# Patient Record
Sex: FEMALE | Race: WHITE | NOT HISPANIC OR LATINO | Employment: OTHER | URBAN - METROPOLITAN AREA
[De-identification: names, ages, dates, MRNs, and addresses within clinical notes are randomized per-mention and may not be internally consistent; named-entity substitution may affect disease eponyms.]

---

## 2017-06-15 ENCOUNTER — OFFICE VISIT (OUTPATIENT)
Dept: OBSTETRICS AND GYNECOLOGY | Facility: CLINIC | Age: 67
End: 2017-06-15

## 2017-06-15 VITALS
SYSTOLIC BLOOD PRESSURE: 138 MMHG | BODY MASS INDEX: 28.61 KG/M2 | DIASTOLIC BLOOD PRESSURE: 80 MMHG | HEIGHT: 64 IN | WEIGHT: 167.6 LBS

## 2017-06-15 DIAGNOSIS — Z01.419 ENCOUNTER FOR GYNECOLOGICAL EXAMINATION WITHOUT ABNORMAL FINDING: ICD-10-CM

## 2017-06-15 DIAGNOSIS — Z78.0 MENOPAUSE: Primary | ICD-10-CM

## 2017-06-15 DIAGNOSIS — M85.80 OSTEOPENIA: ICD-10-CM

## 2017-06-15 PROCEDURE — G0101 CA SCREEN;PELVIC/BREAST EXAM: HCPCS | Performed by: OBSTETRICS & GYNECOLOGY

## 2017-06-15 NOTE — PROGRESS NOTES
"   Chief Complaint   Patient presents with   • Gynecologic Exam   • Med Refill     calcium with vit d       Vashti Pichardo is a 66 y.o. year old  presenting to be seen for a wellness examination.  This patient is menopausal.  She does not use estrogen/progestin therapy.  She denies vaginal dryness or irritation.  She has a history of osteopenia of the femoral necks and does weight-bearing exercise and also takes calcium with vitamin D daily.  She is due for a repeat DEXA.  Breast imaging in 2016 was benign.  She has previously had a posterior/enterocele repair with excellent results.    SCREENING TESTS    Year 2012   Age                         PAP      *                   HPV high risk                         Mammogram     benign *                   WOOD score                         Breast MRI                         Lipids                         Vitamin D                         Colonoscopy                         DEXA  Frax (hip/any) osteopenia                        Ovarian Screen                           Enter the month test was performed.  If month not known, enter \"X'  · Black numbers = normal results  · Red numbers = abnormal results  · Black X = patient reported normal  · Red X - patient reported abnormal      Referred by:    Profession:    Other info:        She exercises regularly: yes.  She wears her seat belt: yes.  She has concerns about domestic violence: no.  She has noticed changes in height: no    GYN screening history:  · Last mammogram: was done on approximately 8/15/2016 and the result was: Birads II (Benign findings)..    No Additional Complaints Reported    The following portions of the patient's history were reviewed and updated as appropriate:vital signs and   She  does not have any pertinent problems on file.  She  has a past surgical history that includes " "Laparoscopic tubal ligation; Posterior repair; Enterocele repair; Cataract extraction, bilateral; Knee surgery (Left, 08/23/2007); and Breast biopsy (Left, 1990's).  Her family history includes Arrhythmia in her mother; Coronary artery disease in her brother; Heart attack in her father; Hyperlipidemia in her other; Osteoarthritis in her other; Osteoporosis in her mother; Rheum arthritis in her other; Stroke in her other. There is no history of Breast cancer or Ovarian cancer.  She  reports that she has never smoked. She has never used smokeless tobacco. She reports that she drinks alcohol. She reports that she does not use illicit drugs.  Current Outpatient Prescriptions   Medication Sig Dispense Refill   • aspirin 81 MG tablet Take 81 mg by mouth daily.     • calcium carbonate-vitamin d 600-400 MG-UNIT per tablet Take 1 tablet by mouth Daily. 90 tablet 3   • Cholecalciferol (VITAMIN D3) 1000 UNITS capsule Take 1 capsule by mouth Daily.     • lisinopril-hydrochlorothiazide (PRINZIDE,ZESTORETIC) 20-25 MG per tablet Take 1 tablet by mouth daily.     • vitamin D (ERGOCALCIFEROL) 29880 UNITS capsule capsule Take 50,000 Units by mouth 1 (one) time per week.       No current facility-administered medications for this visit.      She is allergic to penicillins..    Review of Systems  A comprehensive review of systems was taken.  Constitutional: negative for fever, chills, activity change, appetite change, fatigue and unexpected weight change.  Respiratory: negative  Cardiovascular: negative  Gastrointestinal: negative  Genitourinary:negative  Musculoskeletal:negative  Behavioral/Psych: negative       /80  Ht 64\" (162.6 cm)  Wt 167 lb 9.6 oz (76 kg)  LMP  (LMP Unknown)  BMI 28.77 kg/m2    Physical Exam    General:  alert; cooperative; well developed; well nourished   Skin:  No suspicious lesions seen   Thyroid: normal to inspection and palpation   Lungs:  clear to auscultation bilaterally   Heart:  regular rate " and rhythm, S1, S2 normal, no murmur, click, rub or gallop   Breasts:  Examined in supine position  Symmetric without masses or skin dimpling  Nipples normal without inversion, lesions or discharge  There are no palpable axillary nodes   Abdomen: soft, non-tender; no masses  no umbilical or inginual hernias are present  no hepato-splenomegaly   Pelvis: Clinical staff was present for exam  External genitalia:  normal appearance of the external genitalia including Bartholin's and [place]'s glands.  Vaginal:  there is the tip of an ethibond suture visible posteriorally.  This is nontender and causing no symptoms.  I was unable to snip the suture without causing pain.  Cervix:  normal appearance.  Uterus:  normal size, shape and consistency. anteverted;  Adnexa:  non palpable bilaterally.  Rectal:  anus visually normal appearing. recto-vaginal exam unremarkable and confirms findings;     Lab Review   No data reviewed    Imaging  Mammogram results         ASSESSMENT  Problems Addressed this Visit        Musculoskeletal and Integument    Osteopenia    Relevant Medications    calcium carbonate-vitamin d 600-400 MG-UNIT per tablet       Genitourinary    Menopause - Primary      Other Visit Diagnoses     Encounter for gynecological examination without abnormal finding              PLAN    Medications prescribed this encounter:    New Medications Ordered This Visit   Medications   • Cholecalciferol (VITAMIN D3) 1000 UNITS capsule     Sig: Take 1 capsule by mouth Daily.   • calcium carbonate-vitamin d 600-400 MG-UNIT per tablet     Sig: Take 1 tablet by mouth Daily.     Dispense:  90 tablet     Refill:  3   · Pap test done  · Calcium, 600 mg/ Vit. D, 400 IU daily; regular weight-bearing exercise  · Follow up: 12 month(s)  *Please note that portions of this documentation may have been completed with a voice recognition program.  Efforts were made to edit this dictation, but occasional words may have been mistranscribed.        This note was electronically signed.    ART Mantilla MD  Yamilka 15, 2017  1:30 PM

## 2017-07-10 ENCOUNTER — TRANSCRIBE ORDERS (OUTPATIENT)
Dept: ADMINISTRATIVE | Facility: HOSPITAL | Age: 67
End: 2017-07-10

## 2017-07-10 DIAGNOSIS — Z12.31 VISIT FOR SCREENING MAMMOGRAM: Primary | ICD-10-CM

## 2017-08-16 ENCOUNTER — HOSPITAL ENCOUNTER (OUTPATIENT)
Dept: MAMMOGRAPHY | Facility: HOSPITAL | Age: 67
Discharge: HOME | End: 2017-08-16
Attending: OBSTETRICS & GYNECOLOGY | Admitting: OBSTETRICS & GYNECOLOGY

## 2017-08-16 DIAGNOSIS — Z12.31 VISIT FOR SCREENING MAMMOGRAM: ICD-10-CM

## 2017-08-16 PROCEDURE — G0202 SCR MAMMO BI INCL CAD: HCPCS

## 2017-08-16 PROCEDURE — 77063 BREAST TOMOSYNTHESIS BI: CPT | Performed by: RADIOLOGY

## 2017-08-16 PROCEDURE — G0202 SCR MAMMO BI INCL CAD: HCPCS | Performed by: RADIOLOGY

## 2017-08-16 PROCEDURE — 77063 BREAST TOMOSYNTHESIS BI: CPT

## 2018-06-18 ENCOUNTER — OFFICE VISIT (OUTPATIENT)
Dept: OBSTETRICS AND GYNECOLOGY | Facility: CLINIC | Age: 68
End: 2018-06-18

## 2018-06-18 VITALS
DIASTOLIC BLOOD PRESSURE: 64 MMHG | BODY MASS INDEX: 28.82 KG/M2 | SYSTOLIC BLOOD PRESSURE: 130 MMHG | HEIGHT: 64 IN | WEIGHT: 168.8 LBS

## 2018-06-18 DIAGNOSIS — M85.88 OSTEOPENIA OF OTHER SITE: ICD-10-CM

## 2018-06-18 DIAGNOSIS — Z01.419 ENCOUNTER FOR GYNECOLOGICAL EXAMINATION WITHOUT ABNORMAL FINDING: ICD-10-CM

## 2018-06-18 DIAGNOSIS — Z78.0 MENOPAUSE: Primary | ICD-10-CM

## 2018-06-18 PROCEDURE — G0101 CA SCREEN;PELVIC/BREAST EXAM: HCPCS | Performed by: OBSTETRICS & GYNECOLOGY

## 2018-06-18 RX ORDER — RISEDRONATE SODIUM 35 MG/1
1 TABLET, DELAYED RELEASE ORAL WEEKLY
COMMUNITY
Start: 2018-06-06 | End: 2020-06-30

## 2018-06-18 NOTE — PATIENT INSTRUCTIONS
Fall Prevention in the Home  Falls can cause injuries. They can happen to people of all ages. There are many things you can do to make your home safe and to help prevent falls.  What can I do on the outside of my home?  · Regularly fix the edges of walkways and driveways and fix any cracks.  · Remove anything that might make you trip as you walk through a door, such as a raised step or threshold.  · Trim any bushes or trees on the path to your home.  · Use bright outdoor lighting.  · Clear any walking paths of anything that might make someone trip, such as rocks or tools.  · Regularly check to see if handrails are loose or broken. Make sure that both sides of any steps have handrails.  · Any raised decks and porches should have guardrails on the edges.  · Have any leaves, snow, or ice cleared regularly.  · Use sand or salt on walking paths during winter.  · Clean up any spills in your garage right away. This includes oil or grease spills.  What can I do in the bathroom?  · Use night lights.  · Install grab bars by the toilet and in the tub and shower. Do not use towel bars as grab bars.  · Use non-skid mats or decals in the tub or shower.  · If you need to sit down in the shower, use a plastic, non-slip stool.  · Keep the floor dry. Clean up any water that spills on the floor as soon as it happens.  · Remove soap buildup in the tub or shower regularly.  · Attach bath mats securely with double-sided non-slip rug tape.  · Do not have throw rugs and other things on the floor that can make you trip.  What can I do in the bedroom?  · Use night lights.  · Make sure that you have a light by your bed that is easy to reach.  · Do not use any sheets or blankets that are too big for your bed. They should not hang down onto the floor.  · Have a firm chair that has side arms. You can use this for support while you get dressed.  · Do not have throw rugs and other things on the floor that can make you trip.  What can I do in the  kitchen?  · Clean up any spills right away.  · Avoid walking on wet floors.  · Keep items that you use a lot in easy-to-reach places.  · If you need to reach something above you, use a strong step stool that has a grab bar.  · Keep electrical cords out of the way.  · Do not use floor polish or wax that makes floors slippery. If you must use wax, use non-skid floor wax.  · Do not have throw rugs and other things on the floor that can make you trip.  What can I do with my stairs?  · Do not leave any items on the stairs.  · Make sure that there are handrails on both sides of the stairs and use them. Fix handrails that are broken or loose. Make sure that handrails are as long as the stairways.  · Check any carpeting to make sure that it is firmly attached to the stairs. Fix any carpet that is loose or worn.  · Avoid having throw rugs at the top or bottom of the stairs. If you do have throw rugs, attach them to the floor with carpet tape.  · Make sure that you have a light switch at the top of the stairs and the bottom of the stairs. If you do not have them, ask someone to add them for you.  What else can I do to help prevent falls?  · Wear shoes that:  ? Do not have high heels.  ? Have rubber bottoms.  ? Are comfortable and fit you well.  ? Are closed at the toe. Do not wear sandals.  · If you use a stepladder:  ? Make sure that it is fully opened. Do not climb a closed stepladder.  ? Make sure that both sides of the stepladder are locked into place.  ? Ask someone to hold it for you, if possible.  · Clearly myron and make sure that you can see:  ? Any grab bars or handrails.  ? First and last steps.  ? Where the edge of each step is.  · Use tools that help you move around (mobility aids) if they are needed. These include:  ? Canes.  ? Walkers.  ? Scooters.  ? Crutches.  · Turn on the lights when you go into a dark area. Replace any light bulbs as soon as they burn out.  · Set up your furniture so you have a clear path.  Avoid moving your furniture around.  · If any of your floors are uneven, fix them.  · If there are any pets around you, be aware of where they are.  · Review your medicines with your doctor. Some medicines can make you feel dizzy. This can increase your chance of falling.  Ask your doctor what other things that you can do to help prevent falls.  This information is not intended to replace advice given to you by your health care provider. Make sure you discuss any questions you have with your health care provider.  Document Released: 10/14/2010 Document Revised: 05/25/2017 Document Reviewed: 01/22/2016  Elsevier Interactive Patient Education © 2018 [employer]

## 2018-06-18 NOTE — PROGRESS NOTES
Chief Complaint   Patient presents with   • Gynecologic Exam     discuss bone density results and treatment       Vashti Pichardo is a 67 y.o. year old  presenting to be seen for her annual exam.This patient is menopausal and is followed without estrogen/progestin therapy.  She has some symptoms of vaginal atrophy.  She is treated for severe osteopenia of the left femoral neck by her primary care physician, taking Risedronate, 35 mg weekly.  She has no postmenopausal bleeding.  She denies bowel or urinary symptoms.  She is previously had a posterior/enterocele repair with excellent results.    SCREENING TESTS    Year 2012   Age                         PAP      Neg.                   HPV high risk                         Mammogram      benign                   WOOD score                         Breast MRI                         Lipids                         Vitamin D                         Colonoscopy                         DEXA  Frax (hip/any)                         Ovarian Screen                             She exercises regularly: yes.  She wears her seat belt: yes.  She has concerns about domestic violence: no.  She has noticed changes in height: no    GYN screening history:  · Last pap: was done on approximately 6/15/2017 and the result was: normal PAP.  · Last mammogram: was done on approximately 2017 and the result was: Birads II (Benign findings).  · Last DEXA: was done on approximately 2017 and the results were: osteopenia of the femoral neck (left).    No Additional Complaints Reported    The following portions of the patient's history were reviewed and updated as appropriate:vital signs and   She  has a past medical history of HTN (hypertension); Knee swelling; Menopause; Osteoarthritis; Osteopenia; and Tear of meniscus of knee.  She  does not have any pertinent problems on  "file.  She  has a past surgical history that includes Laparoscopic tubal ligation; Posterior repair; Enterocele repair; Cataract extraction, bilateral; Knee surgery (Left, 08/23/2007); and Breast biopsy (Left, 1990's).  Her family history includes Arrhythmia in her mother; Coronary artery disease in her brother; Heart attack in her father; Hyperlipidemia in her other; Osteoarthritis in her other; Osteoporosis in her mother; Rheum arthritis in her other; Stroke in her other.  She  reports that she has never smoked. She has never used smokeless tobacco. She reports that she drinks alcohol. She reports that she does not use drugs.  Current Outpatient Prescriptions   Medication Sig Dispense Refill   • aspirin 81 MG tablet Take 81 mg by mouth daily.     • calcium carbonate-vitamin d 600-400 MG-UNIT per tablet Take 1 tablet by mouth Daily. 90 tablet 3   • Cholecalciferol (VITAMIN D3) 1000 UNITS capsule Take 1 capsule by mouth Daily.     • lisinopril-hydrochlorothiazide (PRINZIDE,ZESTORETIC) 20-25 MG per tablet Take 1 tablet by mouth daily.     • Risedronate Sodium 35 MG tablet delayed-release Take 1 tablet by mouth 1 (One) Time Per Week.       No current facility-administered medications for this visit.      She is allergic to penicillins..    Review of Systems  A comprehensive review of systems was taken.  Constitutional: negative for fever, chills, activity change, appetite change, fatigue and unexpected weight change.  Respiratory: negative  Cardiovascular: negative  Gastrointestinal: negative  Genitourinary:negative  Musculoskeletal:negative  Behavioral/Psych: negative       /64   Ht 162.6 cm (64\")   Wt 76.6 kg (168 lb 12.8 oz)   LMP  (LMP Unknown) Comment: POST-MENOPAUSAL  BMI 28.97 kg/m²     Physical Exam    General:  alert; cooperative; well developed; well nourished   Skin:  No suspicious lesions seen   Thyroid: normal to inspection and palpation   Lungs:  clear to auscultation bilaterally   Heart:  " regular rate and rhythm, S1, S2 normal, no murmur, click, rub or gallop   Breasts:  Examined in supine position  Symmetric without masses or skin dimpling  Nipples normal without inversion, lesions or discharge  There are no palpable axillary nodes   Abdomen: soft, non-tender; no masses  no umbilical or inginual hernias are present  no hepato-splenomegaly   Pelvis: Clinical staff was present for exam  External genitalia:  normal appearance of the external genitalia including Bartholin's and [place]'s glands.  Vaginal:  atrophic mucosal changes are present;  Cervix:  normal appearance.  Uterus:  normal size, shape and consistency. anteverted;  Adnexa:  non palpable bilaterally.  Rectal:  anus visually normal appearing. recto-vaginal exam unremarkable and confirms findings;     Lab Review   CBC results and CMP results    Imaging  Mammogram results- benign and DEXA- severe osteopenia of the left femoral neck         ASSESSMENT  Problems Addressed this Visit        Musculoskeletal and Integument    Osteopenia       Genitourinary    Menopause - Primary      Other Visit Diagnoses     Encounter for gynecological examination without abnormal finding              PLAN    · Medications prescribed this encounter:  No orders of the defined types were placed in this encounter.  · Monthly self breast assessment and annual breast imaging  · Calcium, 600 mg/ Vit. D, 400 IU daily; regular weight-bearing exercise  · Follow up: 12 month(s)  *Please note that portions of this documentation may have been completed with a voice recognition program.  Efforts were made to edit this dictation, but occasional words may have been mistranscribed.       This note was electronically signed.    ART Mantilla MD  June 18, 2018  1:30 PM

## 2018-07-19 ENCOUNTER — TRANSCRIBE ORDERS (OUTPATIENT)
Dept: OBSTETRICS AND GYNECOLOGY | Facility: CLINIC | Age: 68
End: 2018-07-19

## 2018-07-19 DIAGNOSIS — Z12.31 VISIT FOR SCREENING MAMMOGRAM: Primary | ICD-10-CM

## 2018-08-20 ENCOUNTER — HOSPITAL ENCOUNTER (OUTPATIENT)
Dept: MAMMOGRAPHY | Facility: HOSPITAL | Age: 68
Discharge: HOME | End: 2018-08-20
Attending: OBSTETRICS & GYNECOLOGY | Admitting: OBSTETRICS & GYNECOLOGY

## 2018-08-20 DIAGNOSIS — Z12.31 VISIT FOR SCREENING MAMMOGRAM: ICD-10-CM

## 2018-08-20 PROCEDURE — 77067 SCR MAMMO BI INCL CAD: CPT | Performed by: RADIOLOGY

## 2018-08-20 PROCEDURE — 77067 SCR MAMMO BI INCL CAD: CPT

## 2018-08-20 PROCEDURE — 77063 BREAST TOMOSYNTHESIS BI: CPT | Performed by: RADIOLOGY

## 2018-08-20 PROCEDURE — 77063 BREAST TOMOSYNTHESIS BI: CPT

## 2019-06-19 ENCOUNTER — OFFICE VISIT (OUTPATIENT)
Dept: OBSTETRICS AND GYNECOLOGY | Facility: CLINIC | Age: 69
End: 2019-06-19

## 2019-06-19 VITALS
WEIGHT: 156.4 LBS | SYSTOLIC BLOOD PRESSURE: 118 MMHG | HEIGHT: 64 IN | DIASTOLIC BLOOD PRESSURE: 60 MMHG | BODY MASS INDEX: 26.7 KG/M2

## 2019-06-19 DIAGNOSIS — Z78.0 MENOPAUSE: Primary | ICD-10-CM

## 2019-06-19 DIAGNOSIS — M85.852 OSTEOPENIA OF LEFT HIP: ICD-10-CM

## 2019-06-19 DIAGNOSIS — Z01.419 ENCOUNTER FOR GYNECOLOGICAL EXAMINATION WITHOUT ABNORMAL FINDING: ICD-10-CM

## 2019-06-19 PROCEDURE — G0101 CA SCREEN;PELVIC/BREAST EXAM: HCPCS | Performed by: OBSTETRICS & GYNECOLOGY

## 2019-06-19 NOTE — PROGRESS NOTES
Chief Complaint   Patient presents with   • Gynecologic Exam       Vashti Pichardo is a 68 y.o. year old  presenting to be seen for her annual exam.  This patient is menopausal and does not use estrogen/progestin replacement therapy.  She has had laparoscopic tubal banding.  She had a posterior/enterocele repair for a large recto-enterocele.  She has done well since that time with no symptoms.  She denies urinary symptoms.  She has a history of moderate osteopenia of the left hip and femoral neck.  She is not on treatment.    SCREENING TESTS    Year 2012 2016 2017   Age                         PAP                         HPV high risk                         Mammogram       benign                  WOOD score                         Breast MRI                         Lipids                         Vitamin D                         Colonoscopy                         DEXA  Frax (hip/any) Penia                        Ovarian Screen                             She exercises regularly: yes.  She wears her seat belt: yes.  She has concerns about domestic violence: no.  She has noticed changes in height: no    GYN screening history:  · Last mammogram: was done on approximately 2018 and the result was: Birads II (Benign findings)..    No Additional Complaints Reported    The following portions of the patient's history were reviewed and updated as appropriate:vital signs and   She  has a past medical history of HTN (hypertension), Knee swelling, Menopause, Osteoarthritis, Osteopenia, and Tear of meniscus of knee.  She does not have any pertinent problems on file.  She  has a past surgical history that includes Laparoscopic tubal ligation; Posterior repair; Enterocele repair; Cataract extraction, bilateral; Knee surgery (Left, 2007); Breast biopsy (Left, ); and Replacement total knee (Right).  Her family  "history includes Arrhythmia in her mother; Coronary artery disease in her brother; Heart attack in her father; Hyperlipidemia in her other; Osteoarthritis in her other; Osteoporosis in her mother; Rheum arthritis in her other; Stroke in her other.  She  reports that she has never smoked. She has never used smokeless tobacco. She reports that she drinks alcohol. She reports that she does not use drugs.  Current Outpatient Medications   Medication Sig Dispense Refill   • aspirin 81 MG tablet Take 81 mg by mouth daily.     • calcium carbonate-vitamin d 600-400 MG-UNIT per tablet Take 1 tablet by mouth Daily. 90 tablet 3   • Cholecalciferol (VITAMIN D3) 1000 UNITS capsule Take 1 capsule by mouth Daily.     • lisinopril-hydrochlorothiazide (PRINZIDE,ZESTORETIC) 20-25 MG per tablet Take 1 tablet by mouth daily.     • Risedronate Sodium 35 MG tablet delayed-release Take 1 tablet by mouth 1 (One) Time Per Week.       No current facility-administered medications for this visit.      She is allergic to penicillins..    Review of Systems  A comprehensive review of systems was taken.  Constitutional: negative for fever, chills, activity change, appetite change, fatigue and unexpected weight change.  Respiratory: negative  Cardiovascular: negative  Gastrointestinal: negative  Genitourinary:negative  Musculoskeletal:negative  Behavioral/Psych: negative       /60   Ht 162.6 cm (64\")   Wt 70.9 kg (156 lb 6.4 oz)   LMP  (LMP Unknown)   Breastfeeding? No   BMI 26.85 kg/m²     Physical Exam    General:  alert; cooperative; well developed; well nourished   Skin:  No suspicious lesions seen   Thyroid: normal to inspection and palpation   Lungs:  clear to auscultation bilaterally   Heart:  regular rate and rhythm, S1, S2 normal, no murmur, click, rub or gallop   Breasts:  Examined in supine position  Symmetric without masses or skin dimpling  Nipples normal without inversion, lesions or discharge  There are no palpable " axillary nodes   Abdomen: soft, non-tender; no masses  no umbilical or inguinal hernias are present  no hepato-splenomegaly   Pelvis: Clinical staff was present for exam  External genitalia:  normal appearance of the external genitalia including Bartholin's and [place]'s glands.  Vaginal:  normal pink mucosa without prolapse or lesions. there is persistent suture in the posterior vagina (Ethibond) that is not eroding or causing symptoms  Cervix:  normal appearance.  Uterus:  normal size, shape and consistency. anteverted;  Adnexa:  non palpable bilaterally.  Rectal:  anus visually normal appearing. recto-vaginal exam unremarkable and confirms findings;     Lab Review   No data reviewed    Imaging  Mammogram results- Birads II         ASSESSMENT  Problems Addressed this Visit        Musculoskeletal and Integument    Osteopenia    Relevant Orders    DEXA Bone Density Axial       Genitourinary    Menopause - Primary      Other Visit Diagnoses     Encounter for gynecological examination without abnormal finding              PLAN    · Medications prescribed this encounter:  No orders of the defined types were placed in this encounter.  · Monthly self breast assessment and annual breast imaging  · DEXA ordered  · I have instructed the patient to contact me should she have any symptoms from the vaginal suture.  · Calcium, 600 mg/ Vit. D, 400 IU daily; regular weight-bearing exercise  · Follow up: 12 month(s)  *Please note that portions of this documentation may have been completed with a voice recognition program.  Efforts were made to edit this dictation, but occasional words may have been mistranscribed.       This note was electronically signed.    ART Mantilla MD  June 19, 2019  2:01 PM

## 2019-07-12 ENCOUNTER — TRANSCRIBE ORDERS (OUTPATIENT)
Dept: ADMINISTRATIVE | Facility: HOSPITAL | Age: 69
End: 2019-07-12

## 2019-07-12 DIAGNOSIS — Z12.31 VISIT FOR SCREENING MAMMOGRAM: Primary | ICD-10-CM

## 2019-08-14 ENCOUNTER — HOSPITAL ENCOUNTER (OUTPATIENT)
Dept: BONE DENSITY | Facility: HOSPITAL | Age: 69
Discharge: HOME | End: 2019-08-14
Admitting: OBSTETRICS & GYNECOLOGY

## 2019-08-14 DIAGNOSIS — M85.852 OSTEOPENIA OF LEFT HIP: ICD-10-CM

## 2019-08-14 PROCEDURE — 77080 DXA BONE DENSITY AXIAL: CPT

## 2019-08-21 ENCOUNTER — HOSPITAL ENCOUNTER (OUTPATIENT)
Dept: MAMMOGRAPHY | Facility: HOSPITAL | Age: 69
Discharge: HOME | End: 2019-08-21
Admitting: OBSTETRICS & GYNECOLOGY

## 2019-08-21 DIAGNOSIS — Z12.31 VISIT FOR SCREENING MAMMOGRAM: ICD-10-CM

## 2019-08-21 PROCEDURE — 77063 BREAST TOMOSYNTHESIS BI: CPT | Performed by: RADIOLOGY

## 2019-08-21 PROCEDURE — 77067 SCR MAMMO BI INCL CAD: CPT | Performed by: RADIOLOGY

## 2019-08-21 PROCEDURE — 77063 BREAST TOMOSYNTHESIS BI: CPT

## 2019-08-21 PROCEDURE — 77067 SCR MAMMO BI INCL CAD: CPT

## 2020-06-30 ENCOUNTER — OFFICE VISIT (OUTPATIENT)
Dept: OBSTETRICS AND GYNECOLOGY | Facility: CLINIC | Age: 70
End: 2020-06-30

## 2020-06-30 VITALS
BODY MASS INDEX: 25.81 KG/M2 | SYSTOLIC BLOOD PRESSURE: 142 MMHG | HEIGHT: 64 IN | WEIGHT: 151.2 LBS | DIASTOLIC BLOOD PRESSURE: 86 MMHG

## 2020-06-30 DIAGNOSIS — Z78.0 MENOPAUSE: Primary | ICD-10-CM

## 2020-06-30 DIAGNOSIS — Z01.419 ENCOUNTER FOR GYNECOLOGICAL EXAMINATION WITHOUT ABNORMAL FINDING: ICD-10-CM

## 2020-06-30 PROCEDURE — G0101 CA SCREEN;PELVIC/BREAST EXAM: HCPCS | Performed by: OBSTETRICS & GYNECOLOGY

## 2020-06-30 NOTE — PROGRESS NOTES
Chief Complaint   Patient presents with   • Gynecologic Exam       Vashti Pichardo is a 70 y.o. year old  presenting to be seen for her annual exam.  This patient has previously had tubal sterilization and has had a posterior/enterocele repair.  She has a fragment of Ethibond suture that is visible in the posterior vaginal mucosa but has been stable.  She has no bowel or urinary symptoms.  She has a history of osteoporosis of the femoral neck and is treated at the [facility] with Prolia subcutaneous injections.  She is scheduled for her second injection next month.  She does not use estrogen/progestin replacement therapy.  She denies menopausal symptoms.    SCREENING TESTS    Year 2012   Age                         PAP      Neg.                   HPV high risk                         Mammogram       benign benign                 WOOD score                         Breast MRI                         Lipids                         Vitamin D                         Colonoscopy                         DEXA  Frax (hip/any)        osteoporosis                 Ovarian Screen                             She exercises regularly: yes.  She wears her seat belt: yes.  She has concerns about domestic violence: no.  She has noticed changes in height: no    GYN screening history:  · Last mammogram: was done on approximately 2019 and the result was: Birads I (Normal).  · Last DEXA: was done on approximately 2019 and the results were: osteopenia of spine and osteoporosis of hips.    No Additional Complaints Reported    The following portions of the patient's history were reviewed and updated as appropriate:vital signs and   She  has a past medical history of HTN (hypertension), Knee swelling, Menopause, Osteoarthritis, Osteopenia, and Tear of meniscus of knee.  She does not have any pertinent problems  "on file.  She  has a past surgical history that includes Laparoscopic tubal ligation; Posterior repair; Enterocele repair; Cataract extraction, bilateral; Knee surgery (Left, 08/23/2007); Breast biopsy (Left, 1990's); Replacement total knee (Right); and Replacement total knee (Left).  Her family history includes Arrhythmia in her mother; Coronary artery disease in her brother; Heart attack in her father; Hyperlipidemia in an other family member; Osteoarthritis in an other family member; Osteoporosis in her mother; Rheum arthritis in an other family member; Stroke in an other family member.  She  reports that she has never smoked. She has never used smokeless tobacco. She reports that she drinks alcohol. She reports that she does not use drugs.  Current Outpatient Medications   Medication Sig Dispense Refill   • aspirin 81 MG tablet Take 81 mg by mouth daily.     • lisinopril-hydrochlorothiazide (PRINZIDE,ZESTORETIC) 20-25 MG per tablet Take 1 tablet by mouth daily.       No current facility-administered medications for this visit.      She is allergic to penicillins..    Review of Systems  A review of systems was taken.  She denies cough, fever, and shortness of breath  Constitutional: negative for fever, chills, activity change, appetite change, fatigue and unexpected weight change.  Respiratory: negative  Cardiovascular: negative  Gastrointestinal: negative  Genitourinary:negative  Musculoskeletal:negative  Behavioral/Psych: negative       /86   Ht 162.6 cm (64\")   Wt 68.6 kg (151 lb 3.2 oz)   LMP  (LMP Unknown)   Breastfeeding No   BMI 25.95 kg/m²     Physical Exam    General:  alert; cooperative; well developed; well nourished   Skin:  No suspicious lesions seen   Thyroid: normal to inspection and palpation   Lungs:  clear to auscultation bilaterally   Heart:  regular rate and rhythm, S1, S2 normal, no murmur, click, rub or gallop   Breasts:  Examined in supine position  Symmetric without masses or " skin dimpling  Nipples normal without inversion, lesions or discharge  There are no palpable axillary nodes   Abdomen: soft, non-tender; no masses  no umbilical or inguinal hernias are present  no hepato-splenomegaly   Pelvis: Clinical staff was present for exam  External genitalia:  normal appearance of the external genitalia including Bartholin's and Mulberry's glands.  Vaginal:  normal pink mucosa without prolapse or lesions. there is a fragment of ethibond suture in posterior vagina- stable  Cervix:  normal appearance.  Uterus:  normal size, shape and consistency. anteverted;  Adnexa:  non palpable bilaterally.  Rectal:  anus visually normal appearing. recto-vaginal exam unremarkable and confirms findings;     Lab Review   No data reviewed    Imaging  Mammogram results and DEXA         ASSESSMENT  Problems Addressed this Visit        Genitourinary    Menopause - Primary      Other Visit Diagnoses     Encounter for gynecological examination without abnormal finding                  Substance History:   reports that she has never smoked. She has never used smokeless tobacco.   reports that she drinks alcohol.   reports that she does not use drugs.    Substance use counseling is not indicated based on patient history.  She states that her alcohol intake is social, and controlled.      PLAN    · Medications prescribed this encounter:  No orders of the defined types were placed in this encounter.  · Monthly self breast assessment and annual breast imaging  · Follow-up with Rheumatology for treatment of osteoporosis.  Repeat DEXA in August 2021  · Calcium, 600 mg/ Vit. D, 400 IU daily; regular weight-bearing exercise  · Follow up: 12 month(s)  *Please note that portions of this documentation may have been completed with a voice recognition program.  Efforts were made to edit this dictation, but occasional words may have been mistranscribed.       This note was electronically signed.    ART Mantilla MD  June 30,  2020  13:38

## 2020-07-08 ENCOUNTER — TRANSCRIBE ORDERS (OUTPATIENT)
Dept: ADMINISTRATIVE | Facility: HOSPITAL | Age: 70
End: 2020-07-08

## 2020-07-08 DIAGNOSIS — Z12.31 VISIT FOR SCREENING MAMMOGRAM: Primary | ICD-10-CM

## 2020-09-24 ENCOUNTER — HOSPITAL ENCOUNTER (OUTPATIENT)
Dept: MAMMOGRAPHY | Facility: HOSPITAL | Age: 70
Discharge: HOME | End: 2020-09-24
Admitting: OBSTETRICS & GYNECOLOGY

## 2020-09-24 DIAGNOSIS — Z12.31 VISIT FOR SCREENING MAMMOGRAM: ICD-10-CM

## 2020-09-24 PROCEDURE — 77067 SCR MAMMO BI INCL CAD: CPT | Performed by: RADIOLOGY

## 2020-09-24 PROCEDURE — 77067 SCR MAMMO BI INCL CAD: CPT

## 2020-09-24 PROCEDURE — 77063 BREAST TOMOSYNTHESIS BI: CPT

## 2020-09-24 PROCEDURE — 77063 BREAST TOMOSYNTHESIS BI: CPT | Performed by: RADIOLOGY

## 2021-07-07 ENCOUNTER — OFFICE VISIT (OUTPATIENT)
Dept: OBSTETRICS AND GYNECOLOGY | Facility: CLINIC | Age: 71
End: 2021-07-07

## 2021-07-07 VITALS
WEIGHT: 162.8 LBS | BODY MASS INDEX: 27.79 KG/M2 | HEIGHT: 64 IN | DIASTOLIC BLOOD PRESSURE: 66 MMHG | SYSTOLIC BLOOD PRESSURE: 128 MMHG

## 2021-07-07 DIAGNOSIS — Z01.419 ENCOUNTER FOR GYNECOLOGICAL EXAMINATION WITHOUT ABNORMAL FINDING: Primary | ICD-10-CM

## 2021-07-07 DIAGNOSIS — Z78.0 MENOPAUSE: ICD-10-CM

## 2021-07-07 DIAGNOSIS — M81.0 AGE-RELATED OSTEOPOROSIS WITHOUT CURRENT PATHOLOGICAL FRACTURE: ICD-10-CM

## 2021-07-07 PROCEDURE — G0101 CA SCREEN;PELVIC/BREAST EXAM: HCPCS | Performed by: OBSTETRICS & GYNECOLOGY

## 2021-07-07 RX ORDER — MULTIVIT-MIN/IRON/FOLIC ACID/K 18-600-40
1 CAPSULE ORAL DAILY
COMMUNITY
End: 2022-07-08

## 2021-07-07 NOTE — PROGRESS NOTES
Chief Complaint   Patient presents with   • Gynecologic Exam       Vashti Pichardo is a 71 y.o. year old  presenting to be seen for her annual exam. This patient has previously had laparoscopic tubal banding and I did a posterior/enterocele repair for rectocele/enterocele. She does not use estrogen/progestin replacement therapy.  She has a history of osteoporosis of the femoral necks and osteopenia of the total hip. She has had no atraumatic fractures. She receives Prolia subcutaneous injections every 6 months at the [facility] from Dr. Cuevas. She has no side effects on Prolia. She denies bowel or urinary symptoms.    SCREENING TESTS    Year 2012   Age                         PAP                         HPV high risk                         Mammogram         benign                WOOD score                         Breast MRI                         Lipids                         Vitamin D                         Colonoscopy                         DEXA  Frax (hip/any)        osteoporosis                 Ovarian Screen                             She exercises regularly: yes.  She wears her seat belt: yes.  She has concerns about domestic violence: no.  She has noticed changes in height: no    GYN screening history:  · Last mammogram: was done on approximately 2020 and the result was: Birads II (Benign findings).  · Last DEXA: was done on approximately 2019 and the results were: osteoporosis of hips.    No Additional Complaints Reported    The following portions of the patient's history were reviewed and updated as appropriate:vital signs and   She  has a past medical history of HTN (hypertension), Knee swelling, Menopause, Osteoarthritis, Osteopenia, and Tear of meniscus of knee.  She does not have any pertinent problems on file.  She  has a past surgical history that includes  "Laparoscopic tubal ligation; Posterior repair; Enterocele repair; Cataract extraction, bilateral; Knee surgery (Left, 08/23/2007); Replacement total knee (Right); Replacement total knee (Left); and Breast biopsy (Left, 1990's).  Her family history includes Arrhythmia in her mother; Coronary artery disease in her brother; Heart attack in her father; Hyperlipidemia in an other family member; Osteoarthritis in an other family member; Osteoporosis in her mother; Rheum arthritis in an other family member; Stroke in an other family member.  She  reports that she has never smoked. She has never used smokeless tobacco. She reports current alcohol use. She reports that she does not use drugs.  Current Outpatient Medications   Medication Sig Dispense Refill   • Ascorbic Acid (VITAMIN C ADULT GUMMIES PO) Take 282 mg by mouth Daily. Patient takes 3 gummies/day     • calcium carbonate-cholecalciferol (Calcium 500 +D) 500-400 MG-UNIT tablet tablet Take 2 tablets by mouth Daily.     • Vitamin D, Cholecalciferol, 50 MCG (2000 UT) capsule Take 1 capsule by mouth Daily.     • aspirin 81 MG tablet Take 81 mg by mouth daily.     • lisinopril-hydrochlorothiazide (PRINZIDE,ZESTORETIC) 20-25 MG per tablet Take 1 tablet by mouth daily.       No current facility-administered medications for this visit.     She is allergic to penicillins..    Review of Systems  A review of systems was taken.  Constitutional: negative for fever, chills, activity change, appetite change, fatigue and unexpected weight change.  Respiratory: negative  Cardiovascular: negative  Gastrointestinal: negative  Genitourinary:negative  Musculoskeletal:negative  Behavioral/Psych: negative     Counseling/Anticipatory Guidance Discussed: nutrition, physical activity, healthy weight, injury prevention, immunizations, screenings and self-breast exam      /66   Ht 162.6 cm (64\")   Wt 73.8 kg (162 lb 12.8 oz)   LMP  (LMP Unknown)   Breastfeeding No   BMI 27.94 kg/m² "     BMI reviewed     MEDICALLY INDICATED   Physical Exam    Neuro: alert and oriented to person, place and time    General:  alert; cooperative; well developed; well nourished   Skin:  No suspicious lesions seen   Thyroid: normal to inspection and palpation   Lungs:  breathing is unlabored  clear to auscultation bilaterally   Heart:  regular rate and rhythm, S1, S2 normal, no murmur, click, rub or gallop  normal apical impulse   Breasts:  Examined in supine position  Symmetric without masses or skin dimpling  Nipples normal without inversion, lesions or discharge  There are no palpable axillary nodes   Abdomen: soft, non-tender; no masses  no umbilical or inguinal hernias are present  no hepato-splenomegaly   Pelvis: Clinical staff was present for exam  External genitalia:  normal appearance of the external genitalia including Bartholin's and Wanette's glands.  Vaginal:  normal pink mucosa without prolapse or lesions.  Cervix:  normal appearance.  Uterus:  normal size, shape and consistency. anteverted;  Adnexa:  non palpable bilaterally.  Rectal:  anus visually normal appearing. recto-vaginal exam unremarkable and confirms findings;     Lab Review   No data reviewed    Imaging  Mammogram results and DEXA              ASSESSMENT  Problems Addressed this Visit        Genitourinary and Reproductive     Menopause       Musculoskeletal and Injuries    Osteoporosis without current pathological fracture    Relevant Orders    DEXA Bone Density Axial      Other Visit Diagnoses     Encounter for gynecological examination without abnormal finding    -  Primary      Diagnoses       Codes Comments    Encounter for gynecological examination without abnormal finding    -  Primary ICD-10-CM: Z01.419  ICD-9-CM: V72.31     Menopause     ICD-10-CM: Z78.0  ICD-9-CM: 627.2     Age-related osteoporosis without current pathological fracture     ICD-10-CM: M81.0  ICD-9-CM: 733.01               Substance History:   reports that she has never  smoked. She has never used smokeless tobacco.   reports current alcohol use.   reports no history of drug use.    Substance use counseling is not indicated based on patient history. She indicates that her alcohol intake is social, and controlled.      PLAN    · Medications prescribed this encounter:  No orders of the defined types were placed in this encounter.  · Monthly self breast assessment and annual breast imaging  · DEXA ordered  · Calcium, 600 mg/ Vit. D, 400 IU daily; regular weight-bearing exercise  · Follow up: 12 month(s)  *Please note that portions of this documentation may have been completed with a voice recognition program.  Efforts were made to edit this dictation, but occasional words may have been mistranscribed.       This note was electronically signed.    ART Mantilla MD  July 7, 2021  11:33 EDT

## 2021-08-16 ENCOUNTER — APPOINTMENT (OUTPATIENT)
Dept: BONE DENSITY | Facility: HOSPITAL | Age: 71
End: 2021-08-16

## 2021-08-16 DIAGNOSIS — M81.0 AGE-RELATED OSTEOPOROSIS WITHOUT CURRENT PATHOLOGICAL FRACTURE: ICD-10-CM

## 2021-08-16 PROCEDURE — 77080 DXA BONE DENSITY AXIAL: CPT

## 2021-08-24 ENCOUNTER — TRANSCRIBE ORDERS (OUTPATIENT)
Dept: ADMINISTRATIVE | Facility: HOSPITAL | Age: 71
End: 2021-08-24

## 2021-08-24 DIAGNOSIS — Z12.31 VISIT FOR SCREENING MAMMOGRAM: Primary | ICD-10-CM

## 2021-10-11 ENCOUNTER — HOSPITAL ENCOUNTER (OUTPATIENT)
Dept: MAMMOGRAPHY | Facility: HOSPITAL | Age: 71
Discharge: HOME | End: 2021-10-11
Admitting: OBSTETRICS & GYNECOLOGY

## 2021-10-11 DIAGNOSIS — Z12.31 VISIT FOR SCREENING MAMMOGRAM: ICD-10-CM

## 2021-10-11 PROCEDURE — 77067 SCR MAMMO BI INCL CAD: CPT | Performed by: RADIOLOGY

## 2021-10-11 PROCEDURE — 77063 BREAST TOMOSYNTHESIS BI: CPT | Performed by: RADIOLOGY

## 2021-10-11 PROCEDURE — 77063 BREAST TOMOSYNTHESIS BI: CPT

## 2021-10-11 PROCEDURE — 77067 SCR MAMMO BI INCL CAD: CPT

## 2021-11-16 ENCOUNTER — HOSPITAL ENCOUNTER (OUTPATIENT)
Dept: MAMMOGRAPHY | Facility: HOSPITAL | Age: 71
Discharge: HOME | End: 2021-11-16
Admitting: RADIOLOGY

## 2021-11-16 DIAGNOSIS — R92.8 ABNORMAL MAMMOGRAM: ICD-10-CM

## 2021-11-16 PROCEDURE — 77065 DX MAMMO INCL CAD UNI: CPT | Performed by: RADIOLOGY

## 2021-11-16 PROCEDURE — G0279 TOMOSYNTHESIS, MAMMO: HCPCS | Performed by: RADIOLOGY

## 2021-11-16 PROCEDURE — G0279 TOMOSYNTHESIS, MAMMO: HCPCS

## 2021-11-16 PROCEDURE — 77065 DX MAMMO INCL CAD UNI: CPT

## 2022-07-08 ENCOUNTER — OFFICE VISIT (OUTPATIENT)
Dept: OBSTETRICS AND GYNECOLOGY | Facility: CLINIC | Age: 72
End: 2022-07-08

## 2022-07-08 VITALS
RESPIRATION RATE: 16 BRPM | SYSTOLIC BLOOD PRESSURE: 138 MMHG | BODY MASS INDEX: 29.52 KG/M2 | WEIGHT: 172 LBS | DIASTOLIC BLOOD PRESSURE: 80 MMHG

## 2022-07-08 DIAGNOSIS — Z01.419 ENCOUNTER FOR GYNECOLOGICAL EXAMINATION WITHOUT ABNORMAL FINDING: Primary | ICD-10-CM

## 2022-07-08 DIAGNOSIS — N95.2 VAGINAL ATROPHY: ICD-10-CM

## 2022-07-08 DIAGNOSIS — M81.0 OSTEOPOROSIS WITHOUT CURRENT PATHOLOGICAL FRACTURE, UNSPECIFIED OSTEOPOROSIS TYPE: ICD-10-CM

## 2022-07-08 PROCEDURE — 99397 PER PM REEVAL EST PAT 65+ YR: CPT | Performed by: NURSE PRACTITIONER

## 2022-07-08 RX ORDER — CHOLECALCIFEROL (VITAMIN D3) 125 MCG
CAPSULE ORAL
COMMUNITY

## 2022-07-08 RX ORDER — DENOSUMAB 60 MG/ML
1 INJECTION SUBCUTANEOUS
COMMUNITY
Start: 2021-06-03

## 2022-07-08 NOTE — PROGRESS NOTES
Annual Visit     Patient Name: Vashti Pichardo  : 1950   MRN: [id number]   Care Team: Patient Care Team:  Roselyn Leon MD as PCP - General (Family Medicine)  Jorge Mantilla MD (Inactive) as Consulting Physician (Gynecology)    Chief Complaint:    Chief Complaint   Patient presents with   • Annual Exam       HPI: Vashti Pichardo is a 72 y.o. year old  presenting to be seen for her gynecologic exam.   S/p posterior and enterocele repair - she is doing well- no vaginal pain/pressure     Mammogram 10/2021 birads 0   Left breast dx mammogram birads 2 - return to routine screening     DEXA 2021 osteopenia   Significant improvement noted from previous scan   Hx osteoporosis   Receives prolia injections at [facility]     Colonoscopy due this yr or next she thinks - PCP orders for her     Has been a pt of Dr. Mantilla for many yrs       Subjective      /80   Resp 16   Wt 78 kg (172 lb)   LMP  (LMP Unknown)   Breastfeeding No   BMI 29.52 kg/m²     BMI reviewed: Body mass index is 29.52 kg/m².      Objective     Physical Exam    Neuro: alert and oriented to person, place and time   General:  alert; cooperative; well developed; well nourished   Skin:  No suspicious lesions seen   Thyroid: normal to inspection and palpation   Lungs:  breathing is unlabored  clear to auscultation bilaterally   Heart:  regular rate and rhythm, S1, S2 normal, no murmur, click, rub or gallop  normal apical impulse   Breasts:  Examined in supine position  Symmetric without masses or skin dimpling  Nipples normal without inversion, lesions or discharge  There are no palpable axillary nodes   Abdomen: soft, non-tender; no masses  no umbilical or inguinal hernias are present  no hepato-splenomegaly   Pelvis: Clinical staff was present for exam  External genitalia:  normal appearance of the external genitalia including Bartholin's and Vergas's glands.  :  urethral meatus normal;  Vaginal:  atrophic mucosal  changes are present; suture noted posterior vaginal wall   Cervix:  normal appearance.  Uterus:  normal size, shape and consistency.  Adnexa:  non palpable bilaterally.  Rectal:  digital rectal exam not performed; anus visually normal appearing. external hemorrhoids present;         Assessment / Plan      Assessment  Problems Addressed This Visit    ICD-10-CM ICD-9-CM   1. Encounter for gynecological examination without abnormal finding  Z01.419 V72.31   2. Vaginal atrophy  N95.2 627.3   3. Osteoporosis without current pathological fracture, unspecified osteoporosis type  M81.0 733.00       Plan    Discussed monthly SBEs and importance of annual imaging   Mammogram due in October     Discussed vaginal atrophy, she is asymptomatic - will cont to monitor annually   Discussed suture present on exam - she has no pain or bldg - if these develop, she will let us know   Otherwise, will cont to monitor annually     [facility] osteoporosis txment at [facility]   Reviewed DEXA - UTD   Discussed Calcium, 600 mg/ Vit. D, 400 IU daily    AV 1 yr             Follow Up  Return in about 1 year (around 7/8/2023) for Annual physical.  Patient was given instructions and counseling regarding her condition or for health maintenance advice. Please see specific information pulled into the AVS if appropriate.     Swapna Domínguez, APRN  July 8, 2022  11:37 EDT

## 2022-10-18 ENCOUNTER — TRANSCRIBE ORDERS (OUTPATIENT)
Dept: OBSTETRICS AND GYNECOLOGY | Facility: CLINIC | Age: 72
End: 2022-10-18

## 2022-10-18 DIAGNOSIS — Z12.31 VISIT FOR SCREENING MAMMOGRAM: Primary | ICD-10-CM

## 2023-03-24 ENCOUNTER — HOSPITAL ENCOUNTER (OUTPATIENT)
Dept: MAMMOGRAPHY | Facility: HOSPITAL | Age: 73
Discharge: HOME | End: 2023-03-24
Admitting: NURSE PRACTITIONER
Payer: MEDICARE

## 2023-03-24 DIAGNOSIS — Z12.31 VISIT FOR SCREENING MAMMOGRAM: ICD-10-CM

## 2023-03-24 PROCEDURE — 77063 BREAST TOMOSYNTHESIS BI: CPT | Performed by: RADIOLOGY

## 2023-03-24 PROCEDURE — 77063 BREAST TOMOSYNTHESIS BI: CPT

## 2023-03-24 PROCEDURE — 77067 SCR MAMMO BI INCL CAD: CPT

## 2023-03-24 PROCEDURE — 77067 SCR MAMMO BI INCL CAD: CPT | Performed by: RADIOLOGY

## 2023-08-09 ENCOUNTER — OFFICE VISIT (OUTPATIENT)
Dept: OBSTETRICS AND GYNECOLOGY | Facility: CLINIC | Age: 73
End: 2023-08-09
Payer: MEDICARE

## 2023-08-09 VITALS
WEIGHT: 166.8 LBS | SYSTOLIC BLOOD PRESSURE: 150 MMHG | DIASTOLIC BLOOD PRESSURE: 82 MMHG | HEIGHT: 64 IN | BODY MASS INDEX: 28.48 KG/M2

## 2023-08-09 DIAGNOSIS — Z01.419 ENCOUNTER FOR GYNECOLOGICAL EXAMINATION WITHOUT ABNORMAL FINDING: Primary | ICD-10-CM

## 2023-08-09 DIAGNOSIS — Z12.11 SCREENING FOR COLON CANCER: ICD-10-CM

## 2023-08-09 RX ORDER — MAGNESIUM GLUCONATE 30 MG(550)
30 TABLET ORAL DAILY
COMMUNITY

## 2023-08-09 RX ORDER — UBIDECARENONE 75 MG
100 CAPSULE ORAL DAILY
COMMUNITY

## 2023-08-09 RX ORDER — MULTIPLE VITAMINS W/ MINERALS TAB 9MG-400MCG
1 TAB ORAL DAILY
COMMUNITY

## 2023-08-09 NOTE — PROGRESS NOTES
Chief Complaint  Vashti Pichardo is a 73 y.o.  female presenting for Gynecologic Exam    History of Present Illness  Yanira is a 72yo woman, , here today for gyn exam.  Her surgical hx includes, in part, BTL and posterior/enterocele repair.  HTN is treated; sl elevated today.  (She attributes to salty chips last noc & [place] traffic.)  She will re-ck within the next 48 hrs, and notify PCP is still up.  She is having DEXA scan within next 10 days.  (Using Prolia from [facility].)  Mammogram is up to date 2023.  Needs colon cancer screening.  She had nl colonoscopy at 52yo (no polyps).  No FamHx colon ca.    Otherwise, ROS neg.    The following portions of the patient's history were reviewed and updated as appropriate: allergies, current medications, past family history, past medical history, past social history, past surgical history, and problem list.    Allergies   Allergen Reactions    Penicillins Rash         Current Outpatient Medications:     Diclofenac Sodium (VOLTAREN) 1 % gel gel, Apply 4 g topically to the appropriate area as directed As Needed., Disp: , Rfl:     Ascorbic Acid (VITAMIN C ADULT GUMMIES PO), Take 282 mg by mouth Daily. Patient takes 3 gummies/day, Disp: , Rfl:     aspirin 81 MG tablet, Take 81 mg by mouth daily., Disp: , Rfl:     calcium carbonate-cholecalciferol (Calcium 500 +D) 500-400 MG-UNIT tablet tablet, Take 2 tablets by mouth Daily., Disp: , Rfl:     Cholecalciferol 25 MCG (1000 UT) capsule, Take 1 tablet by mouth Daily., Disp: , Rfl:     denosumab (Prolia) 60 MG/ML solution prefilled syringe syringe, Inject 1 mL under the skin into the appropriate area as directed Every 6 (Six) Months., Disp: , Rfl:     lisinopril-hydrochlorothiazide (PRINZIDE,ZESTORETIC) 20-25 MG per tablet, Take 1 tablet by mouth daily., Disp: , Rfl:     Magnesium Gluconate 550 MG tablet, Take 30 mg by mouth Daily., Disp: , Rfl:     multivitamin with minerals (MULTIVITAMIN ADULTS 50+ PO), Take 1  "tablet by mouth Daily., Disp: , Rfl:     vitamin B-12 (cyanocobalamin) 100 MCG tablet, Take 1 tablet by mouth Daily., Disp: , Rfl:     Past Medical History:   Diagnosis Date    HTN (hypertension)     Knee swelling     Menopause     Osteoarthritis     Osteopenia     Tear of meniscus of knee         Past Surgical History:   Procedure Laterality Date    BREAST BIOPSY Left 1990's    Ultrasound guided - Benign results    CATARACT EXTRACTION, BILATERAL      ENTEROCELE REPAIR      KNEE SURGERY Left 08/23/2007    [facility], [facility], [facility] - Dr. Rabago    LAPAROSCOPIC TUBAL LIGATION      POSTERIOR REPAIR      REPLACEMENT TOTAL KNEE Right     REPLACEMENT TOTAL KNEE Left        Objective  /82   Ht 162.6 cm (64\")   Wt 75.7 kg (166 lb 12.8 oz)   LMP  (LMP Unknown)   Breastfeeding No   BMI 28.63 kg/mý     Physical Exam  Vitals and nursing note reviewed. Exam conducted with a chaperone present.   Constitutional:       General: She is not in acute distress.     Appearance: Normal appearance. She is not ill-appearing.   HENT:      Head: Normocephalic.   Neck:      Thyroid: No thyroid mass or thyromegaly.   Cardiovascular:      Rate and Rhythm: Normal rate and regular rhythm.      Heart sounds: Normal heart sounds. No murmur heard.  Pulmonary:      Effort: Pulmonary effort is normal. No respiratory distress.      Breath sounds: Normal breath sounds.   Chest:   Breasts:     Right: No inverted nipple, mass or nipple discharge.      Left: No inverted nipple, mass or nipple discharge.   Abdominal:      Palpations: Abdomen is soft. There is no mass.      Tenderness: There is no abdominal tenderness.   Genitourinary:     General: Normal vulva.      Labia:         Right: No rash, tenderness or lesion.         Left: No rash, tenderness or lesion.       Vagina: Normal. No erythema.      Cervix: No discharge, lesion or erythema.      Uterus: Not enlarged and not tender.       Adnexa:         Right: No mass or tenderness.          Left: No " mass or tenderness.        Comments: Suture visible in the posterior vag wall; she is asymptomatic.  No signs of infection.  No longer SA.  (Never any postcoital bldg when she was SA.)  Mild atrophy.   Anus appears wnl.  No rectal exam performed.  Lymphadenopathy:      Upper Body:      Right upper body: No supraclavicular or axillary adenopathy.      Left upper body: No supraclavicular or axillary adenopathy.   Skin:     General: Skin is warm and dry.   Neurological:      Mental Status: She is alert and oriented to person, place, and time.   Psychiatric:         Mood and Affect: Mood normal.         Behavior: Behavior normal.       Assessment/Plan   Diagnoses and all orders for this visit:    1. Encounter for gynecological examination without abnormal finding (Primary)    2. Screening for colon cancer  -     Cologuard - Stool, Per Rectum; Future        Procedures    40 to 64: Counseling/Anticipatory Guidance Discussed: nutrition, physical activity, screenings, and self-breast exam    Return in about 1 year (around 8/9/2024) for Annual physical.    Althea Pimentel, JERMAINE  08/09/2023

## 2023-08-18 ENCOUNTER — APPOINTMENT (OUTPATIENT)
Dept: BONE DENSITY | Facility: HOSPITAL | Age: 73
End: 2023-08-18
Payer: MEDICARE

## 2023-08-18 DIAGNOSIS — M81.0 OSTEOPOROSIS WITHOUT PATHOLOGICAL FRACTURE: ICD-10-CM

## 2023-08-18 PROCEDURE — 77080 DXA BONE DENSITY AXIAL: CPT

## 2023-09-01 ENCOUNTER — HOSPITAL ENCOUNTER (OUTPATIENT)
Dept: INFUSION THERAPY | Facility: HOSPITAL | Age: 73
Discharge: HOME | End: 2023-09-01
Payer: MEDICARE

## 2023-09-01 VITALS
HEART RATE: 70 BPM | RESPIRATION RATE: 20 BRPM | SYSTOLIC BLOOD PRESSURE: 139 MMHG | DIASTOLIC BLOOD PRESSURE: 64 MMHG | TEMPERATURE: 96.7 F | OXYGEN SATURATION: 97 %

## 2023-09-01 DIAGNOSIS — M81.0 OSTEOPOROSIS WITHOUT CURRENT PATHOLOGICAL FRACTURE, UNSPECIFIED OSTEOPOROSIS TYPE: Primary | ICD-10-CM

## 2023-09-01 PROCEDURE — 96372 THER/PROPH/DIAG INJ SC/IM: CPT

## 2023-09-01 PROCEDURE — 25010000002 DENOSUMAB 60 MG/ML SOLUTION PREFILLED SYRINGE: Performed by: INTERNAL MEDICINE

## 2023-09-01 RX ADMIN — DENOSUMAB 60 MG: 60 INJECTION SUBCUTANEOUS at 10:49

## 2023-09-01 NOTE — CODE DOCUMENTATION
Information for med provided for patient. Patient reports she is taking calcium and Vit D supplements.

## 2024-03-19 ENCOUNTER — TRANSCRIBE ORDERS (OUTPATIENT)
Dept: ADMINISTRATIVE | Facility: HOSPITAL | Age: 74
End: 2024-03-19
Payer: MEDICARE

## 2024-03-19 DIAGNOSIS — Z12.31 VISIT FOR SCREENING MAMMOGRAM: Primary | ICD-10-CM

## 2024-04-25 ENCOUNTER — HOSPITAL ENCOUNTER (OUTPATIENT)
Dept: MAMMOGRAPHY | Facility: HOSPITAL | Age: 74
Discharge: HOME | End: 2024-04-25
Admitting: NURSE PRACTITIONER
Payer: MEDICARE

## 2024-04-25 DIAGNOSIS — Z12.31 VISIT FOR SCREENING MAMMOGRAM: ICD-10-CM

## 2024-04-25 PROCEDURE — 77063 BREAST TOMOSYNTHESIS BI: CPT

## 2024-04-25 PROCEDURE — 77067 SCR MAMMO BI INCL CAD: CPT

## 2024-08-14 ENCOUNTER — OFFICE VISIT (OUTPATIENT)
Dept: OBSTETRICS AND GYNECOLOGY | Facility: CLINIC | Age: 74
End: 2024-08-14
Payer: MEDICARE

## 2024-08-14 VITALS
HEIGHT: 64 IN | BODY MASS INDEX: 27.72 KG/M2 | DIASTOLIC BLOOD PRESSURE: 84 MMHG | SYSTOLIC BLOOD PRESSURE: 150 MMHG | WEIGHT: 162.4 LBS

## 2024-08-14 DIAGNOSIS — Z01.419 ENCOUNTER FOR GYNECOLOGICAL EXAMINATION WITHOUT ABNORMAL FINDING: Primary | ICD-10-CM

## 2024-08-14 PROCEDURE — 3079F DIAST BP 80-89 MM HG: CPT | Performed by: NURSE PRACTITIONER

## 2024-08-14 PROCEDURE — 99459 PELVIC EXAMINATION: CPT | Performed by: NURSE PRACTITIONER

## 2024-08-14 PROCEDURE — 99397 PER PM REEVAL EST PAT 65+ YR: CPT | Performed by: NURSE PRACTITIONER

## 2024-08-14 PROCEDURE — 1160F RVW MEDS BY RX/DR IN RCRD: CPT | Performed by: NURSE PRACTITIONER

## 2024-08-14 PROCEDURE — 3077F SYST BP >= 140 MM HG: CPT | Performed by: NURSE PRACTITIONER

## 2024-08-14 PROCEDURE — 1159F MED LIST DOCD IN RCRD: CPT | Performed by: NURSE PRACTITIONER

## 2024-08-14 RX ORDER — MAGNESIUM GLUCONATE 30 MG(550)
30 TABLET ORAL
COMMUNITY

## 2024-08-14 NOTE — PROGRESS NOTES
Chief Complaint  Vashti Pichardo is a 74 y.o.  female presenting for Gynecologic Exam    History of Present Illness  Yanira is a very pleasant 75yo woman, , here for gyn exam.  Her past surgical history includes, in part, a lap tubal sterilization, and an enterocele repair.  She also had a left breast bx in  (benign).  She has no gyn complaints.   HTN, fairly well controlled  She has osteopenia, and has chosen to discontinue the Prolia; will have repeat DEXA next Aug ().  She is intentional re: calcium and vitamin D intake; walking and wt bearing exercise.  Otherwise, ROS neg    She has aged out of paps, and is okay with discontinuing them.  Mammogram & Cologuard, up to date.    The following portions of the patient's history were reviewed and updated as appropriate: allergies, current medications, past family history, past medical history, past social history, past surgical history, and problem list.    Allergies   Allergen Reactions    Penicillins Rash         Current Outpatient Medications:     ascorbic acid (VITAMIN C) 1000 MG tablet, Take 1 tablet by mouth Daily., Disp: , Rfl:     aspirin 81 MG tablet, Take 1 tablet by mouth Daily., Disp: , Rfl:     calcium carbonate-cholecalciferol (Calcium 500 +D) 500-400 MG-UNIT tablet tablet, Take 2 tablets by mouth Daily., Disp: , Rfl:     Cholecalciferol 25 MCG (1000 UT) capsule, Take 1 tablet by mouth Daily., Disp: , Rfl:     Diclofenac Sodium (VOLTAREN) 1 % gel gel, Apply 4 g topically to the appropriate area as directed As Needed., Disp: , Rfl:     lisinopril-hydrochlorothiazide (PRINZIDE,ZESTORETIC) 20-25 MG per tablet, Take 1 tablet by mouth Daily., Disp: , Rfl:     Magnesium Gluconate 550 MG tablet, Take 30 mg by mouth., Disp: , Rfl:     multivitamin with minerals tablet tablet, Take 1 tablet by mouth Daily., Disp: , Rfl:     vitamin B-12 (CYANOCOBALAMIN) 100 MCG tablet, Take 1 tablet by mouth Daily., Disp: , Rfl:     Past Medical History:  "  Diagnosis Date    HTN (hypertension)     Knee swelling     Menopause     Osteoarthritis     Osteopenia     Tear of meniscus of knee         Past Surgical History:   Procedure Laterality Date    BREAST BIOPSY Left 1990's    Ultrasound guided - Benign results    CATARACT EXTRACTION, BILATERAL      ENTEROCELE REPAIR      KNEE SURGERY Left 08/23/2007    ATS, PLM,PMM, 3CC - Dr. Rabago    LAPAROSCOPIC TUBAL LIGATION      POSTERIOR REPAIR      REPLACEMENT TOTAL KNEE Right     REPLACEMENT TOTAL KNEE Left        Objective  /84   Ht 162.6 cm (64\")   Wt 73.7 kg (162 lb 6.4 oz)   LMP  (LMP Unknown)   Breastfeeding No   BMI 27.88 kg/m²     Physical Exam  Vitals and nursing note reviewed. Exam conducted with a chaperone present.   Constitutional:       General: She is not in acute distress.     Appearance: Normal appearance. She is not ill-appearing.   HENT:      Head: Normocephalic.   Neck:      Thyroid: No thyroid mass or thyromegaly.   Cardiovascular:      Rate and Rhythm: Normal rate and regular rhythm.      Heart sounds: Normal heart sounds. No murmur heard.  Pulmonary:      Effort: Pulmonary effort is normal. No respiratory distress.      Breath sounds: Normal breath sounds.   Chest:   Breasts:     Right: No inverted nipple, mass or nipple discharge.      Left: No inverted nipple, mass or nipple discharge.   Abdominal:      Palpations: Abdomen is soft. There is no mass.      Tenderness: There is no abdominal tenderness.   Genitourinary:     General: Normal vulva.      Labia:         Right: No rash, tenderness or lesion.         Left: No rash, tenderness or lesion.       Vagina: Normal. Erythema present. No vaginal discharge.      Cervix: No discharge, lesion or erythema.      Uterus: Not enlarged and not tender.       Adnexa:         Right: No mass or tenderness.          Left: No mass or tenderness.        Comments: Mild atrophic erythema of the vaginal mucosa; she is asymptomatic.  Anus appears wnl.  No " rectal exam performed.  Lymphadenopathy:      Upper Body:      Right upper body: No supraclavicular or axillary adenopathy.      Left upper body: No supraclavicular or axillary adenopathy.   Skin:     General: Skin is warm and dry.   Neurological:      Mental Status: She is alert and oriented to person, place, and time.   Psychiatric:         Mood and Affect: Mood normal.         Behavior: Behavior normal.         Assessment/Plan   Diagnoses and all orders for this visit:    1. Encounter for gynecological examination without abnormal finding (Primary)        Procedures    40 to 64: Counseling/Anticipatory Guidance Discussed: nutrition, physical activity, screenings, and self-breast exam    Return in about 1 year (around 8/14/2025) for Annual physical.    Althea Pimentel, APRELLA  08/14/2024

## 2025-03-25 ENCOUNTER — TRANSCRIBE ORDERS (OUTPATIENT)
Dept: ADMINISTRATIVE | Facility: HOSPITAL | Age: 75
End: 2025-03-25
Payer: MEDICARE

## 2025-03-25 DIAGNOSIS — Z12.31 OTHER SCREENING MAMMOGRAM: Primary | ICD-10-CM

## 2025-06-05 ENCOUNTER — HOSPITAL ENCOUNTER (OUTPATIENT)
Dept: MAMMOGRAPHY | Facility: HOSPITAL | Age: 75
Discharge: HOME | End: 2025-06-05
Admitting: FAMILY MEDICINE
Payer: MEDICARE

## 2025-06-05 DIAGNOSIS — Z12.31 OTHER SCREENING MAMMOGRAM: ICD-10-CM

## 2025-06-05 PROCEDURE — 77063 BREAST TOMOSYNTHESIS BI: CPT

## 2025-06-05 PROCEDURE — 77067 SCR MAMMO BI INCL CAD: CPT

## 2025-07-10 ENCOUNTER — APPOINTMENT (OUTPATIENT)
Dept: GENERAL RADIOLOGY | Facility: HOSPITAL | Age: 75
End: 2025-07-10
Payer: MEDICARE

## 2025-07-10 ENCOUNTER — APPOINTMENT (OUTPATIENT)
Dept: CT IMAGING | Facility: HOSPITAL | Age: 75
End: 2025-07-10
Payer: MEDICARE

## 2025-07-10 ENCOUNTER — HOSPITAL ENCOUNTER (EMERGENCY)
Facility: HOSPITAL | Age: 75
Discharge: HOME | End: 2025-07-11
Attending: STUDENT IN AN ORGANIZED HEALTH CARE EDUCATION/TRAINING PROGRAM
Payer: MEDICARE

## 2025-07-10 VITALS
OXYGEN SATURATION: 97 % | TEMPERATURE: 97.7 F | BODY MASS INDEX: 28.75 KG/M2 | HEIGHT: 64 IN | DIASTOLIC BLOOD PRESSURE: 84 MMHG | HEART RATE: 98 BPM | SYSTOLIC BLOOD PRESSURE: 168 MMHG | WEIGHT: 168.4 LBS | RESPIRATION RATE: 18 BRPM

## 2025-07-10 DIAGNOSIS — S00.83XA CONTUSION OF CHIN, INITIAL ENCOUNTER: ICD-10-CM

## 2025-07-10 DIAGNOSIS — S01.512A LACERATION OF MOUTH, INITIAL ENCOUNTER: ICD-10-CM

## 2025-07-10 DIAGNOSIS — S52.501A CLOSED FRACTURE OF DISTAL END OF RIGHT RADIUS, UNSPECIFIED FRACTURE MORPHOLOGY, INITIAL ENCOUNTER: Primary | ICD-10-CM

## 2025-07-10 PROCEDURE — 90471 IMMUNIZATION ADMIN: CPT

## 2025-07-10 PROCEDURE — 96374 THER/PROPH/DIAG INJ IV PUSH: CPT

## 2025-07-10 PROCEDURE — 96375 TX/PRO/DX INJ NEW DRUG ADDON: CPT

## 2025-07-10 PROCEDURE — 72125 CT NECK SPINE W/O DYE: CPT

## 2025-07-10 PROCEDURE — 73080 X-RAY EXAM OF ELBOW: CPT

## 2025-07-10 PROCEDURE — 25010000002 MORPHINE PER 10 MG: Performed by: STUDENT IN AN ORGANIZED HEALTH CARE EDUCATION/TRAINING PROGRAM

## 2025-07-10 PROCEDURE — 25010000002 LIDOCAINE 2% SOLUTION

## 2025-07-10 PROCEDURE — 90715 TDAP VACCINE 7 YRS/> IM: CPT

## 2025-07-10 PROCEDURE — 70486 CT MAXILLOFACIAL W/O DYE: CPT

## 2025-07-10 PROCEDURE — 25010000002 ONDANSETRON PER 1 MG: Performed by: STUDENT IN AN ORGANIZED HEALTH CARE EDUCATION/TRAINING PROGRAM

## 2025-07-10 PROCEDURE — 70450 CT HEAD/BRAIN W/O DYE: CPT

## 2025-07-10 PROCEDURE — 25010000002 TETANUS-DIPHTH-ACELL PERTUSSIS 5-2.5-18.5 LF-MCG/0.5 SUSPENSION PREFILLED SYRINGE

## 2025-07-10 PROCEDURE — 73110 X-RAY EXAM OF WRIST: CPT

## 2025-07-10 PROCEDURE — 99284 EMERGENCY DEPT VISIT MOD MDM: CPT | Performed by: STUDENT IN AN ORGANIZED HEALTH CARE EDUCATION/TRAINING PROGRAM

## 2025-07-10 RX ORDER — LIDOCAINE HYDROCHLORIDE 20 MG/ML
10 INJECTION, SOLUTION INFILTRATION; PERINEURAL ONCE
Status: COMPLETED | OUTPATIENT
Start: 2025-07-10 | End: 2025-07-10

## 2025-07-10 RX ORDER — ONDANSETRON 2 MG/ML
4 INJECTION INTRAMUSCULAR; INTRAVENOUS ONCE
Status: COMPLETED | OUTPATIENT
Start: 2025-07-10 | End: 2025-07-10

## 2025-07-10 RX ORDER — SODIUM CHLORIDE 0.9 % (FLUSH) 0.9 %
10 SYRINGE (ML) INJECTION AS NEEDED
Status: DISCONTINUED | OUTPATIENT
Start: 2025-07-10 | End: 2025-07-11 | Stop reason: HOSPADM

## 2025-07-10 RX ORDER — HYDROCODONE BITARTRATE AND ACETAMINOPHEN 5; 325 MG/1; MG/1
1 TABLET ORAL EVERY 6 HOURS PRN
Qty: 12 TABLET | Refills: 0 | Status: SHIPPED | OUTPATIENT
Start: 2025-07-10 | End: 2025-07-13

## 2025-07-10 RX ADMIN — LIDOCAINE HYDROCHLORIDE 10 ML: 20 INJECTION, SOLUTION INFILTRATION; PERINEURAL at 23:51

## 2025-07-10 RX ADMIN — ONDANSETRON 4 MG: 2 INJECTION, SOLUTION INTRAMUSCULAR; INTRAVENOUS at 21:20

## 2025-07-10 RX ADMIN — MORPHINE SULFATE 4 MG: 4 INJECTION, SOLUTION INTRAMUSCULAR; INTRAVENOUS at 21:21

## 2025-07-10 RX ADMIN — TETANUS TOXOID, REDUCED DIPHTHERIA TOXOID AND ACELLULAR PERTUSSIS VACCINE, ADSORBED 0.5 ML: 5; 2.5; 8; 8; 2.5 SUSPENSION INTRAMUSCULAR at 21:26

## 2025-07-11 RX ORDER — HYDROCODONE BITARTRATE AND ACETAMINOPHEN 7.5; 325 MG/1; MG/1
1 TABLET ORAL ONCE
Refills: 0 | Status: COMPLETED | OUTPATIENT
Start: 2025-07-11 | End: 2025-07-11

## 2025-07-11 RX ADMIN — HYDROCODONE BITARTRATE AND ACETAMINOPHEN 1 TABLET: 7.5; 325 TABLET ORAL at 00:19

## 2025-07-11 NOTE — ED PROVIDER NOTES
EMERGENCY DEPARTMENT ENCOUNTER    Pt Name: Vashti Pichardo  MRN: [id number]  Pt :   1950  Room Number:    Date of encounter:  7/10/2025  PCP: Roselyn Leon MD  ED Provider: Eduardo Luz PA-C    Historian: Patient, , nursing notes      HPI:  Chief Complaint: fall, head injury, right wrist pain        Context: Vashti Pichardo is a 75 y.o. female who presents to the ED c/o mechanical trip and fall when her feet became entangled in an area rug in her garage and she fell forward on her outstretched right wrist.  Patient is complaining of an abrasion to her chin and a laceration to the inner aspect of her lower lip and pain in her right wrist.  Patient denies taking anticoagulants and denies neck pain back pain chest or abdominal pain, left upper extremity or bilateral lower extremity pain.  She states she is able to ambulate normally with no pain in the hips or lower extremities.      PAST MEDICAL HISTORY  Past Medical History:   Diagnosis Date    HTN (hypertension)     Knee swelling     Menopause     Osteoarthritis     Osteopenia     Tear of meniscus of knee          PAST SURGICAL HISTORY  Past Surgical History:   Procedure Laterality Date    BREAST BIOPSY Left     Ultrasound guided - Benign results    CATARACT EXTRACTION, BILATERAL      ENTEROCELE REPAIR      KNEE SURGERY Left 2007    ATS, PLM,PMM, 3CC - Dr. Rabago    LAPAROSCOPIC TUBAL LIGATION      POSTERIOR REPAIR      REPLACEMENT TOTAL KNEE Right     REPLACEMENT TOTAL KNEE Left          FAMILY HISTORY  Family History   Problem Relation Age of Onset    Heart attack Father     Osteoporosis Mother     Arrhythmia Mother     Coronary artery disease Brother     Hyperlipidemia Other     Stroke Other     Osteoarthritis Other     Rheum arthritis Other     Breast cancer Neg Hx     Ovarian cancer Neg Hx          SOCIAL HISTORY  Social History     Socioeconomic History    Marital status: Single   Tobacco Use    Smoking  status: Never    Smokeless tobacco: Never   Vaping Use    Vaping status: Never Used   Substance and Sexual Activity    Alcohol use: Not Currently    Drug use: No    Sexual activity: Defer     Birth control/protection: Abstinence, Post-menopausal         ALLERGIES  Penicillins        REVIEW OF SYSTEMS  Review of Systems   Constitutional:  Negative for chills and fever.   HENT:  Negative for congestion and sore throat.         Mouth laceration   Respiratory:  Negative for cough and shortness of breath.    Cardiovascular:  Negative for chest pain.   Gastrointestinal:  Negative for abdominal pain, nausea and vomiting.   Genitourinary:  Negative for dysuria.   Musculoskeletal:  Negative for back pain.        Right wrist pain   Skin:  Negative for wound.   Neurological:  Negative for dizziness and headaches.   Psychiatric/Behavioral:  Negative for confusion.    All other systems reviewed and are negative.         All systems reviewed and negative except for those discussed in HPI.       PHYSICAL EXAM    I have reviewed the triage vital signs and nursing notes.    ED Triage Vitals [07/10/25 2012]   Temp Heart Rate Resp BP SpO2   97.7 °F (36.5 °C) 98 18 168/84 97 %      Temp src Heart Rate Source Patient Position BP Location FiO2 (%)   Oral Monitor Sitting Left arm --       Physical Exam  Vitals and nursing note reviewed.   Constitutional:       General: She is not in acute distress.     Appearance: She is not ill-appearing, toxic-appearing or diaphoretic.   HENT:      Head: Normocephalic.        Mouth/Throat:      Mouth: Mucous membranes are moist.      Pharynx: Oropharynx is clear.     Eyes:      Extraocular Movements: Extraocular movements intact.   Cardiovascular:      Rate and Rhythm: Normal rate.      Heart sounds: Normal heart sounds.   Pulmonary:      Effort: Pulmonary effort is normal. No respiratory distress.      Breath sounds: Normal breath sounds.   Abdominal:      Tenderness: There is no abdominal tenderness.    Musculoskeletal:      Right elbow: Normal.      Right forearm: Tenderness and bony tenderness present.      Right wrist: Deformity and bony tenderness present.   Skin:     General: Skin is warm and dry.      Findings: No rash.   Neurological:      Mental Status: She is alert.             LAB RESULTS  No results found for this or any previous visit (from the past 24 hours).    If labs were ordered, I independently reviewed the results and considered them in treating the patient.        RADIOLOGY  XR Elbow 3+ View Right  Result Date: 7/10/2025  FINAL REPORT TECHNIQUE: null CLINICAL HISTORY: fall, eval fracture COMPARISON: null FINDINGS: 3 view right elbow Comparison: None provided Findings: No acute fractures. Normal alignment. No significant arthritic change or erosions. No joint effusion. No radiopaque foreign body.     IMPRESSION: 1. No acute findings. Authenticated and Electronically Signed by Nito Chino on 07/10/2025 10:38:11 PM    XR Wrist 3+ View Right  Result Date: 7/10/2025  FINAL REPORT TECHNIQUE: null CLINICAL HISTORY: FOOSH, eval fracture COMPARISON: null FINDINGS: 3 view right wrist Comparison: None provided Findings: There is a comminuted angulated intra-articular distal radial fracture. There is a distracted ulnar styloid process fracture No dislocations. No significant arthritic change or erosions. No radiopaque foreign body.     IMPRESSION: 1. Comminuted angulated intra-articular distal radial fracture. 2. Ulnar styloid process fracture. Authenticated and Electronically Signed by Nito Chino on 07/10/2025 10:29:19 PM    CT Head Without Contrast  Result Date: 7/10/2025  FINAL REPORT TECHNIQUE: null CLINICAL HISTORY: fall, mandible trauma,eval fracture/ich COMPARISON: null FINDINGS: CT head without contrast Comparison: None provided Findings: No intra-axial mass, midline shift, hydrocephalus, or acute hemorrhage. No significant atrophy-like change or white matter disease. The visualized paranasal  sinuses and mastoid air cells are normal. The orbits are unremarkable. No skull fracture.     IMPRESSION: 1. No acute intracranial findings. Authenticated and Electronically Signed by Nito Chino on 07/10/2025 10:27:37 PM    CT Cervical Spine Without Contrast  Result Date: 7/10/2025  FINAL REPORT TECHNIQUE: null CLINICAL HISTORY: fall, eval fracture COMPARISON: null FINDINGS: CT cervical spine without contrast Comparison: None provided Findings: Normal vertebral body alignment. No significant degenerative change. No acute fractures or dislocations. Visualized intracranial contents are unremarkable. Soft tissues of the neck are normal. Lung apices are clear.     IMPRESSION: No acute findings. Authenticated and Electronically Signed by Nito Chino on 07/10/2025 10:24:15 PM    CT Facial Bones Without Contrast  Result Date: 7/10/2025  FINAL REPORT TECHNIQUE: null CLINICAL HISTORY: fall mandible trauma eval fracture COMPARISON: null FINDINGS: CT maxillofacial without contrast Comparison: None provided Findings: No acute fractures. Temporomandibular joints are intact. Paranasal sinuses and mastoid air cells clear. Unremarkable orbital contents. Visualized intracranial contents are within normal limits. No foreign bodies.     IMPRESSION: Unremarkable maxillofacial CT. Authenticated and Electronically Signed by Nito Chino on 07/10/2025 10:15:41 PM      I ordered and independently reviewed the above noted radiographic studies.      I viewed images of right wrist x-ray which showed distal radius and ulnar styloid fracture per my independent interpretation.    I viewed images of right elbow x-ray which showed no fracture per my independent interpretation    I viewed images of CT head which showed no acute intracranial abnormalities per my independent interpretation    I viewed images of CT facial bones which showed no fracture per my independent interpretation     I viewed images of CT cervical spine which showed no  fracture per my independent interpretation    See radiologist's dictation for official interpretation.        PROCEDURES    FX Dislocation    Date/Time: 7/10/2025 11:55 PM    Performed by: Eduardo Luz PA-C  Authorized by: Flip Rios MD    Consent:     Consent obtained:  Verbal    Consent given by:  Patient    Risks, benefits, and alternatives were discussed: yes      Risks discussed:  Nerve damage, pain and vascular damage    Alternatives discussed:  No treatment  Universal protocol:     Procedure explained and questions answered to patient or proxy's satisfaction: yes      Imaging studies available: yes      Immediately prior to procedure, a time out was called: yes      Patient identity confirmed:  Arm band  Injury:     Injury location:  Forearm    Forearm injury location:  R forearm    Forearm fracture type: distal radial    Pre-procedure details:     Distal neurologic exam:  Normal    Distal perfusion: distal pulses strong and brisk capillary refill      Range of motion: reduced    Anesthesia:     Anesthesia method:  Local infiltration    Local anesthetic:  Lidocaine 2% w/o epi  Procedure details:     Manipulation performed: yes      Skin traction used: yes      Skeletal traction used: yes      Reduction successful: yes      X-ray confirmed reduction: yes      Immobilization:  Sling and splint    Splint type:  Sugar tong    Supplies used:  Sling, fiberglass, cotton padding and elastic bandage    Attestation: Splint applied and adjusted personally by me    Post-procedure details:     Distal neurologic exam:  Normal    Distal perfusion: distal pulses strong and brisk capillary refill      Range of motion: improved      Procedure completion:  Tolerated well, no immediate complications    Fracture management: I provided definitive fracture management        No orders to display       MEDICATIONS GIVEN IN ER    Medications   sodium chloride 0.9 % flush 10 mL (has no administration in time range)    morphine injection 4 mg (4 mg Intravenous Given 7/10/25 2121)   ondansetron (ZOFRAN) injection 4 mg (4 mg Intravenous Given 7/10/25 2120)   Tetanus-Diphth-Acell Pertussis (BOOSTRIX) injection 0.5 mL (0.5 mL Intramuscular Given 7/10/25 2126)   lidocaine (XYLOCAINE) 2% injection 10 mL (10 mL Injection Given by Other 7/10/25 6221)   HYDROcodone-acetaminophen (NORCO) 7.5-325 MG per tablet 1 tablet (1 tablet Oral Given 7/11/25 0019)         MEDICAL DECISION MAKING, PROGRESS, and CONSULTS    All labs, if obtained, have been independently reviewed by me.  All radiology studies, if obtained, have been reviewed by me and the radiologist dictating the report.  All EKG's, if obtained, have been independently viewed and interpreted by me/my attending physician.      Discussion below represents my analysis of pertinent findings related to patient's condition, differential diagnosis, treatment plan and final disposition.    75-year-old female presenting for evaluation after mechanical trip and fall with complaints of a chin contusion mouth laceration and right wrist pain.  On exam patient has an obvious deformity of the right wrist no tenderness over the shoulder or elbow and her right upper extremity is neurovascularly intact.  Contusion of the chin is noted with a mild abrasion on the inferior aspect N/A proximately less than 1 cm laceration of the oral mucosa of the inner lower lip above the gumline that does not cross the vermilion border.  I offered the patient laceration repair procedure of the mouth laceration which she declined.  CT scans of the head facial bones and cervical spine were unremarkable with no fractures or internal hemorrhages.  X-ray of the right wrist did reveal distal radius and ulnar styloid fracture displaced.  Hematoma block was performed and reduction procedure attempted.  Alignment of the distal radius fracture was improved although the fracture does appear comminuted and impacted.      I discussed  the postreduction x-ray with the ED attending Dr. Duran who did not feel that further attempts at reduction would be beneficial and that the patient should be splinted and follow-up with orthopedic surgery as soon as possible.  Therefore placed the patient in a sugar-tong splint with sling pain medication was prescribed and outpatient follow-up with orthopedic surgery recommended as soon as possible.  Strict ED return precautions for evidence of compartment syndrome or other splint complications were explained and the patient verbalized understanding of and agreement with this plan of care.                       Differential diagnosis:    Differential diagnosis included but was not limited to fall, facial bone fracture, ICH, contusion, mouth laceration, fracture, dislocation, ligamentous injury, sprain      Additional sources:    - Discussed/ obtained information from independent historians: Patient's  at bedside    - External (non-ED) record review: Previous medical records reviewed    - Chronic or social conditions impacting care: None    Orders placed during this visit:  Orders Placed This Encounter   Procedures    FX Dislocation Initial    CT Cervical Spine Without Contrast    CT Head Without Contrast    CT Facial Bones Without Contrast    XR Wrist 3+ View Right    XR Elbow 3+ View Right    XR Wrist 3+ View Right    Ambulatory Referral to Orthopedic Surgery    Obtain & Apply The Following- Upper extremity; Sling    Insert Peripheral IV         Additional orders considered but not ordered: None      ED Course:    Consultants: None                Shared Decision Making:  After my consideration of clinical presentation and any laboratory/radiology studies obtained, I discussed the findings with the patient/patient representative who is in agreement with the treatment plan and the final disposition.   Risks and benefits of discharge and/or observation/admission were discussed.       AS OF 01:42 EDT  VITALS:    BP - 168/84  HR - 98  TEMP - 97.7 °F (36.5 °C) (Oral)  O2 SATS - 97%                  DIAGNOSIS  Final diagnoses:   Closed fracture of distal end of right radius, unspecified fracture morphology, initial encounter   Laceration of mouth, initial encounter   Contusion of chin, initial encounter         DISPOSITION  Discharge      Please note that portions of this document were completed with voice recognition software.      Eduardo Luz PA-C  07/11/25 0143       Eduardo Luz PA-C  07/11/25 0144

## 2025-07-11 NOTE — DISCHARGE INSTRUCTIONS
Keep your arm and wrist in the splint as tolerated take your pain medication as prescribed and call the office of orthopedic surgeon Dr. Guerra first thing tomorrow morning to schedule an appointment for reevaluation.  Return to the ER for any acute changes or worsening of your pain including significant pain in the wrist or forearm, development of numbness or paleness of fingers, or for any other concern.  Should those symptoms develop remove your splint immediately and present to the ER right away.

## 2025-07-14 ENCOUNTER — PREP FOR SURGERY (OUTPATIENT)
Dept: OTHER | Facility: HOSPITAL | Age: 75
End: 2025-07-14
Payer: MEDICARE

## 2025-07-14 ENCOUNTER — OFFICE VISIT (OUTPATIENT)
Dept: ORTHOPEDIC SURGERY | Facility: CLINIC | Age: 75
End: 2025-07-14
Payer: MEDICARE

## 2025-07-14 VITALS
WEIGHT: 161 LBS | BODY MASS INDEX: 27.49 KG/M2 | HEIGHT: 64 IN | SYSTOLIC BLOOD PRESSURE: 128 MMHG | DIASTOLIC BLOOD PRESSURE: 74 MMHG

## 2025-07-14 DIAGNOSIS — S52.501A CLOSED FRACTURE OF DISTAL END OF RIGHT RADIUS, UNSPECIFIED FRACTURE MORPHOLOGY, INITIAL ENCOUNTER: Primary | ICD-10-CM

## 2025-07-14 DIAGNOSIS — S52.613A ULNA STYLOID FRACTURE, CLOSED: ICD-10-CM

## 2025-07-14 DIAGNOSIS — S52.611A CLOSED DISPLACED FRACTURE OF STYLOID PROCESS OF RIGHT ULNA, INITIAL ENCOUNTER: ICD-10-CM

## 2025-07-14 PROCEDURE — 3078F DIAST BP <80 MM HG: CPT | Performed by: PHYSICIAN ASSISTANT

## 2025-07-14 PROCEDURE — 1160F RVW MEDS BY RX/DR IN RCRD: CPT | Performed by: PHYSICIAN ASSISTANT

## 2025-07-14 PROCEDURE — 99204 OFFICE O/P NEW MOD 45 MIN: CPT | Performed by: PHYSICIAN ASSISTANT

## 2025-07-14 PROCEDURE — 1159F MED LIST DOCD IN RCRD: CPT | Performed by: PHYSICIAN ASSISTANT

## 2025-07-14 PROCEDURE — 3074F SYST BP LT 130 MM HG: CPT | Performed by: PHYSICIAN ASSISTANT

## 2025-07-14 RX ORDER — FAMOTIDINE 10 MG/ML
20 INJECTION, SOLUTION INTRAVENOUS
Status: CANCELLED | OUTPATIENT
Start: 2025-07-15 | End: 2025-07-16

## 2025-07-14 NOTE — PROGRESS NOTES
Subjective   Patient ID: Vashti Pichardo is a 75 y.o. right hand dominant female  Pain and Injury of the Right Wrist (Reports on 7/10/25 tripping over a rug in her garage landing on outstretched hands. Seen at [facility] ER same day. Presents in splint and sling)         Pain  Injury    Patient presents as a new patient to our clinic for the evaluation of right wrist pain after a mechanical type fall that occurred on 7/10/2025.  She tripped over a rug in her garage landing on an outstretched right hand.  Patient is right-hand dominant.  She was evaluated in the emergency room same day as the injury, diagnosed with wrist fracture and placed in a sugar-tong splint.  Patient states the pain is well-controlled with regular Tylenol.  She was prescribed narcotic medication but is only taken 1 tablet thus far.                                                   Past Medical History:   Diagnosis Date    HTN (hypertension)     Knee swelling     Menopause     Osteoarthritis     Osteopenia     Tear of meniscus of knee         Past Surgical History:   Procedure Laterality Date    BREAST BIOPSY Left 1990's    Ultrasound guided - Benign results    CATARACT EXTRACTION, BILATERAL      ENTEROCELE REPAIR      JOINT REPLACEMENT  2019    Both knees total replacement    KNEE SURGERY Left 08/23/2007    ATS, PLM,PMM, 3CC - Dr. Rabago    LAPAROSCOPIC TUBAL LIGATION      POSTERIOR REPAIR      REPLACEMENT TOTAL KNEE Right 2019    Dr. Weber    REPLACEMENT TOTAL KNEE Left 2019    Dr. Weber       Family History   Problem Relation Age of Onset    Heart attack Father     Osteoporosis Mother     Arrhythmia Mother     Coronary artery disease Brother     Hyperlipidemia Other     Stroke Other     Osteoarthritis Other     Rheum arthritis Other     Breast cancer Neg Hx     Ovarian cancer Neg Hx        Social History     Socioeconomic History    Marital status: Single   Tobacco Use    Smoking status: Never    Smokeless tobacco: Never   Vaping Use  "   Vaping status: Never Used   Substance and Sexual Activity    Alcohol use: Not Currently    Drug use: Never    Sexual activity: Not Currently     Partners: Male     Birth control/protection: Post-menopausal, Tubal ligation         Current Outpatient Medications:     ascorbic acid (VITAMIN C) 1000 MG tablet, Take 1 tablet by mouth Daily., Disp: , Rfl:     aspirin 81 MG tablet, Take 1 tablet by mouth Daily., Disp: , Rfl:     calcium carbonate-cholecalciferol (Calcium 500 +D) 500-400 MG-UNIT tablet tablet, Take 2 tablets by mouth Daily., Disp: , Rfl:     Cholecalciferol 25 MCG (1000 UT) capsule, Take 1 tablet by mouth Daily., Disp: , Rfl:     Diclofenac Sodium (VOLTAREN) 1 % gel gel, Apply 4 g topically to the appropriate area as directed As Needed., Disp: , Rfl:     lisinopril-hydrochlorothiazide (PRINZIDE,ZESTORETIC) 20-25 MG per tablet, Take 1 tablet by mouth Daily., Disp: , Rfl:     Magnesium Gluconate 550 MG tablet, Take 30 mg by mouth., Disp: , Rfl:     multivitamin with minerals tablet tablet, Take 1 tablet by mouth Daily., Disp: , Rfl:     vitamin B-12 (CYANOCOBALAMIN) 100 MCG tablet, Take 1 tablet by mouth Daily., Disp: , Rfl:     Allergies   Allergen Reactions    Penicillins Rash       Review of Systems   Musculoskeletal:  Positive for arthralgias (right wrist) and joint swelling (right wrist).       I have reviewed the medical and surgical history, family history, social history, medications, and/or allergies, and the review of systems of this report.    Objective   /74   Ht 162.6 cm (64\")   Wt 73 kg (161 lb)   LMP  (LMP Unknown)   BMI 27.64 kg/m²    Physical Exam  Vitals and nursing note reviewed.   Constitutional:       Appearance: Normal appearance.   Cardiovascular:      Rate and Rhythm: Normal rate.      Heart sounds: Normal heart sounds.   Pulmonary:      Effort: Pulmonary effort is normal.   Abdominal:      General: There is no distension.   Musculoskeletal:      Right elbow: No deformity. " Normal range of motion. No tenderness.      Right wrist: Swelling, deformity, tenderness and bony tenderness present. No lacerations or snuff box tenderness. Decreased range of motion. Normal pulse.      Right hand: No bony tenderness. Normal range of motion. Normal sensation. Normal capillary refill. Normal pulse.   Neurological:      Mental Status: She is alert and oriented to person, place, and time.   Psychiatric:         Behavior: Behavior normal.       Ortho Exam     Neurological Exam  Mental Status  Alert. Oriented to person, place, and time.             Assessment & Plan   Independent Review of Radiographic Studies:    No new imaging done today.  Reviewed images and agree with radiologist interpretation.      Procedures       Diagnoses and all orders for this visit:    1. Closed fracture of distal end of right radius, unspecified fracture morphology, initial encounter (Primary)    2. Closed displaced fracture of styloid process of right ulna, initial encounter       Discussion of orthopedic goals  Orthopedic activities reviewed and patient expressed appreciation  Risk, benefits, and merits of treatment alternatives reviewed with the patient and questions answered  Reduced physical activity as appropriate and avoid offending activity  Weight bearing parameters reviewed  Use brace as instructed    Recommendations/Plan:  Exercise, medications, injections, other patient advice, and return appointment as noted.  Surgery: Surgery proposed at this visit as noted.  Patient is encouraged to call or return for any issues or concerns.  Cameron and Rx agreement obtained in clinic today.  Discussed possibility of bone being too soft due to history of osteoporosis and expected outcome   We discussed treatment options in the form of surgical ORIF which was recommended versus nonsurgical management, immobilization as well as the risks and benefits associated with each treatment.  We discussed that without ORIF the fracture  would likely heal with malunion, possible nonunion, decreased mobility of the right wrist, chronic pain, posttraumatic arthropathy.      PLAN: Right wrist ORIF      Patient agreeable to call or return sooner for any concerns.  Patient to continue her narcotic pain medication prescribed from ER    BMI is >= 25 and <30. (Overweight) The following options were offered after discussion;: weight loss educational material (shared in after visit summary)               EMR Dragon-transcription disclaimer:  This encounter note is an electronic transcription of spoken language to printed text.  Electronic transcription of spoken language may permit erroneous or at times nonsensical words or phrases to be inadvertently transcribed.  Although I have reviewed the note for such errors, some may still exist

## 2025-07-14 NOTE — H&P (VIEW-ONLY)
Subjective   Patient ID: Vashti Pichardo is a 75 y.o. right hand dominant female  Pain and Injury of the Right Wrist (Reports on 7/10/25 tripping over a rug in her garage landing on outstretched hands. Seen at [facility] ER same day. Presents in splint and sling)         Pain  Injury    Patient presents as a new patient to our clinic for the evaluation of right wrist pain after a mechanical type fall that occurred on 7/10/2025.  She tripped over a rug in her garage landing on an outstretched right hand.  Patient is right-hand dominant.  She was evaluated in the emergency room same day as the injury, diagnosed with wrist fracture and placed in a sugar-tong splint.  Patient states the pain is well-controlled with regular Tylenol.  She was prescribed narcotic medication but is only taken 1 tablet thus far.                                                   Past Medical History:   Diagnosis Date    HTN (hypertension)     Knee swelling     Menopause     Osteoarthritis     Osteopenia     Tear of meniscus of knee         Past Surgical History:   Procedure Laterality Date    BREAST BIOPSY Left 1990's    Ultrasound guided - Benign results    CATARACT EXTRACTION, BILATERAL      ENTEROCELE REPAIR      JOINT REPLACEMENT  2019    Both knees total replacement    KNEE SURGERY Left 08/23/2007    ATS, PLM,PMM, 3CC - Dr. Rabago    LAPAROSCOPIC TUBAL LIGATION      POSTERIOR REPAIR      REPLACEMENT TOTAL KNEE Right 2019    Dr. Weber    REPLACEMENT TOTAL KNEE Left 2019    Dr. Weber       Family History   Problem Relation Age of Onset    Heart attack Father     Osteoporosis Mother     Arrhythmia Mother     Coronary artery disease Brother     Hyperlipidemia Other     Stroke Other     Osteoarthritis Other     Rheum arthritis Other     Breast cancer Neg Hx     Ovarian cancer Neg Hx        Social History     Socioeconomic History    Marital status: Single   Tobacco Use    Smoking status: Never    Smokeless tobacco: Never   Vaping Use  "   Vaping status: Never Used   Substance and Sexual Activity    Alcohol use: Not Currently    Drug use: Never    Sexual activity: Not Currently     Partners: Male     Birth control/protection: Post-menopausal, Tubal ligation         Current Outpatient Medications:     ascorbic acid (VITAMIN C) 1000 MG tablet, Take 1 tablet by mouth Daily., Disp: , Rfl:     aspirin 81 MG tablet, Take 1 tablet by mouth Daily., Disp: , Rfl:     calcium carbonate-cholecalciferol (Calcium 500 +D) 500-400 MG-UNIT tablet tablet, Take 2 tablets by mouth Daily., Disp: , Rfl:     Cholecalciferol 25 MCG (1000 UT) capsule, Take 1 tablet by mouth Daily., Disp: , Rfl:     Diclofenac Sodium (VOLTAREN) 1 % gel gel, Apply 4 g topically to the appropriate area as directed As Needed., Disp: , Rfl:     lisinopril-hydrochlorothiazide (PRINZIDE,ZESTORETIC) 20-25 MG per tablet, Take 1 tablet by mouth Daily., Disp: , Rfl:     Magnesium Gluconate 550 MG tablet, Take 30 mg by mouth., Disp: , Rfl:     multivitamin with minerals tablet tablet, Take 1 tablet by mouth Daily., Disp: , Rfl:     vitamin B-12 (CYANOCOBALAMIN) 100 MCG tablet, Take 1 tablet by mouth Daily., Disp: , Rfl:     Allergies   Allergen Reactions    Penicillins Rash       Review of Systems   Musculoskeletal:  Positive for arthralgias (right wrist) and joint swelling (right wrist).       I have reviewed the medical and surgical history, family history, social history, medications, and/or allergies, and the review of systems of this report.    Objective   /74   Ht 162.6 cm (64\")   Wt 73 kg (161 lb)   LMP  (LMP Unknown)   BMI 27.64 kg/m²    Physical Exam  Vitals and nursing note reviewed.   Constitutional:       Appearance: Normal appearance.   Cardiovascular:      Rate and Rhythm: Normal rate.      Heart sounds: Normal heart sounds.   Pulmonary:      Effort: Pulmonary effort is normal.   Abdominal:      General: There is no distension.   Musculoskeletal:      Right elbow: No deformity. " Normal range of motion. No tenderness.      Right wrist: Swelling, deformity, tenderness and bony tenderness present. No lacerations or snuff box tenderness. Decreased range of motion. Normal pulse.      Right hand: No bony tenderness. Normal range of motion. Normal sensation. Normal capillary refill. Normal pulse.   Neurological:      Mental Status: She is alert and oriented to person, place, and time.   Psychiatric:         Behavior: Behavior normal.       Ortho Exam     Neurological Exam  Mental Status  Alert. Oriented to person, place, and time.             Assessment & Plan   Independent Review of Radiographic Studies:    No new imaging done today.  Reviewed images and agree with radiologist interpretation.      Procedures       Diagnoses and all orders for this visit:    1. Closed fracture of distal end of right radius, unspecified fracture morphology, initial encounter (Primary)    2. Closed displaced fracture of styloid process of right ulna, initial encounter       Discussion of orthopedic goals  Orthopedic activities reviewed and patient expressed appreciation  Risk, benefits, and merits of treatment alternatives reviewed with the patient and questions answered  Reduced physical activity as appropriate and avoid offending activity  Weight bearing parameters reviewed  Use brace as instructed    Recommendations/Plan:  Exercise, medications, injections, other patient advice, and return appointment as noted.  Surgery: Surgery proposed at this visit as noted.  Patient is encouraged to call or return for any issues or concerns.  Cameorn and Rx agreement obtained in clinic today.  Discussed possibility of bone being too soft due to history of osteoporosis and expected outcome   We discussed treatment options in the form of surgical ORIF which was recommended versus nonsurgical management, immobilization as well as the risks and benefits associated with each treatment.  We discussed that without ORIF the fracture  would likely heal with malunion, possible nonunion, decreased mobility of the right wrist, chronic pain, posttraumatic arthropathy.      PLAN: Right wrist ORIF      Patient agreeable to call or return sooner for any concerns.  Patient to continue her narcotic pain medication prescribed from ER    BMI is >= 25 and <30. (Overweight) The following options were offered after discussion;: weight loss educational material (shared in after visit summary)               EMR Dragon-transcription disclaimer:  This encounter note is an electronic transcription of spoken language to printed text.  Electronic transcription of spoken language may permit erroneous or at times nonsensical words or phrases to be inadvertently transcribed.  Although I have reviewed the note for such errors, some may still exist

## 2025-07-15 ENCOUNTER — PRE-ADMISSION TESTING (OUTPATIENT)
Dept: PREADMISSION TESTING | Facility: HOSPITAL | Age: 75
End: 2025-07-15
Payer: MEDICARE

## 2025-07-15 ENCOUNTER — HOSPITAL ENCOUNTER (OUTPATIENT)
Dept: GENERAL RADIOLOGY | Facility: HOSPITAL | Age: 75
Discharge: HOME | End: 2025-07-15
Payer: MEDICARE

## 2025-07-15 DIAGNOSIS — S52.501A CLOSED FRACTURE OF DISTAL END OF RIGHT RADIUS, UNSPECIFIED FRACTURE MORPHOLOGY, INITIAL ENCOUNTER: ICD-10-CM

## 2025-07-15 DIAGNOSIS — S52.613A ULNA STYLOID FRACTURE, CLOSED: ICD-10-CM

## 2025-07-15 LAB
ALBUMIN SERPL-MCNC: 4.2 G/DL (ref 3.5–5.2)
ALBUMIN/GLOB SERPL: 1.2 G/DL
ALP SERPL-CCNC: 74 U/L (ref 39–117)
ALT SERPL W P-5'-P-CCNC: 15 U/L (ref 1–33)
ANION GAP SERPL CALCULATED.3IONS-SCNC: 13.2 MMOL/L (ref 5–15)
AST SERPL-CCNC: 24 U/L (ref 1–32)
BACTERIA UR QL AUTO: ABNORMAL /HPF
BASOPHILS # BLD AUTO: 0.02 10*3/MM3 (ref 0–0.2)
BASOPHILS NFR BLD AUTO: 0.5 % (ref 0–1.5)
BILIRUB SERPL-MCNC: 0.4 MG/DL (ref 0–1.2)
BILIRUB UR QL STRIP: NEGATIVE
BUN SERPL-MCNC: 11 MG/DL (ref 8–23)
BUN/CREAT SERPL: 10.6 (ref 7–25)
CALCIUM SPEC-SCNC: 9.6 MG/DL (ref 8.6–10.5)
CHLORIDE SERPL-SCNC: 97 MMOL/L (ref 98–107)
CLARITY UR: CLEAR
CO2 SERPL-SCNC: 26.8 MMOL/L (ref 22–29)
COLOR UR: YELLOW
CREAT SERPL-MCNC: 1.04 MG/DL (ref 0.57–1)
DEPRECATED RDW RBC AUTO: 45.1 FL (ref 37–54)
EGFRCR SERPLBLD CKD-EPI 2021: 56.2 ML/MIN/1.73
EOSINOPHIL # BLD AUTO: 0.08 10*3/MM3 (ref 0–0.4)
EOSINOPHIL NFR BLD AUTO: 1.9 % (ref 0.3–6.2)
ERYTHROCYTE [DISTWIDTH] IN BLOOD BY AUTOMATED COUNT: 13.8 % (ref 12.3–15.4)
GLOBULIN UR ELPH-MCNC: 3.6 GM/DL
GLUCOSE SERPL-MCNC: 95 MG/DL (ref 65–99)
GLUCOSE UR STRIP-MCNC: NEGATIVE MG/DL
HCT VFR BLD AUTO: 35 % (ref 34–46.6)
HGB BLD-MCNC: 11.8 G/DL (ref 12–15.9)
HGB UR QL STRIP.AUTO: NEGATIVE
HYALINE CASTS UR QL AUTO: ABNORMAL /LPF
IMM GRANULOCYTES # BLD AUTO: 0.03 10*3/MM3 (ref 0–0.05)
IMM GRANULOCYTES NFR BLD AUTO: 0.7 % (ref 0–0.5)
KETONES UR QL STRIP: NEGATIVE
LEUKOCYTE ESTERASE UR QL STRIP.AUTO: ABNORMAL
LYMPHOCYTES # BLD AUTO: 1.34 10*3/MM3 (ref 0.7–3.1)
LYMPHOCYTES NFR BLD AUTO: 31.2 % (ref 19.6–45.3)
MCH RBC QN AUTO: 29.6 PG (ref 26.6–33)
MCHC RBC AUTO-ENTMCNC: 33.7 G/DL (ref 31.5–35.7)
MCV RBC AUTO: 87.9 FL (ref 79–97)
MONOCYTES # BLD AUTO: 0.37 10*3/MM3 (ref 0.1–0.9)
MONOCYTES NFR BLD AUTO: 8.6 % (ref 5–12)
NEUTROPHILS NFR BLD AUTO: 2.46 10*3/MM3 (ref 1.7–7)
NEUTROPHILS NFR BLD AUTO: 57.1 % (ref 42.7–76)
NITRITE UR QL STRIP: NEGATIVE
NRBC BLD AUTO-RTO: 0 /100 WBC (ref 0–0.2)
PH UR STRIP.AUTO: 6.5 [PH] (ref 5–8)
PLATELET # BLD AUTO: 342 10*3/MM3 (ref 140–450)
PMV BLD AUTO: 9.1 FL (ref 6–12)
POTASSIUM SERPL-SCNC: 3.5 MMOL/L (ref 3.5–5.2)
PROT SERPL-MCNC: 7.8 G/DL (ref 6–8.5)
PROT UR QL STRIP: NEGATIVE
RBC # BLD AUTO: 3.98 10*6/MM3 (ref 3.77–5.28)
RBC # UR STRIP: ABNORMAL /HPF
REF LAB TEST METHOD: ABNORMAL
SODIUM SERPL-SCNC: 137 MMOL/L (ref 136–145)
SP GR UR STRIP: 1.01 (ref 1–1.03)
SQUAMOUS #/AREA URNS HPF: ABNORMAL /HPF
UROBILINOGEN UR QL STRIP: ABNORMAL
WBC # UR STRIP: ABNORMAL /HPF
WBC NRBC COR # BLD AUTO: 4.3 10*3/MM3 (ref 3.4–10.8)

## 2025-07-15 PROCEDURE — 81001 URINALYSIS AUTO W/SCOPE: CPT

## 2025-07-15 PROCEDURE — 87081 CULTURE SCREEN ONLY: CPT

## 2025-07-15 PROCEDURE — 71046 X-RAY EXAM CHEST 2 VIEWS: CPT

## 2025-07-15 PROCEDURE — 93005 ELECTROCARDIOGRAM TRACING: CPT

## 2025-07-15 PROCEDURE — 80053 COMPREHEN METABOLIC PANEL: CPT

## 2025-07-15 PROCEDURE — 85025 COMPLETE CBC W/AUTO DIFF WBC: CPT

## 2025-07-15 PROCEDURE — 87086 URINE CULTURE/COLONY COUNT: CPT

## 2025-07-15 PROCEDURE — 36415 COLL VENOUS BLD VENIPUNCTURE: CPT

## 2025-07-15 NOTE — DISCHARGE INSTRUCTIONS
Pre-Admission testing appointment completed today for patient's upcoming procedure here at [facility].    PAT PASS reviewed with patient and they verbalize understanding of the following:     Do not eat or drink anything after midnight the night before procedure unless otherwise instructed by physician/surgeon's office, this includes no gum, candy, mints, tobacco products or e-cigarettes.  Do not shave the area to be operated on at least 48 hours prior to procedure.  Do not wear makeup, lotion, hair products, or nail polish.  Do not wear any jewelry and remove all piercings.  Do not wear any adhesive if you wear dentures.  Do not wear contacts; bring in glasses if needed.  Only take medications on the morning of procedure as instructed by PAT nurse per anesthesia guidelines or as instructed by physician's office.  If you are on any blood thinners reach out to the physician/surgeon's office for instructions on when/if they will need to be stopped prior to procedure.  Bring in picture ID and insurance card, advanced directive copies if applicable, CPAP/BIPAP/Inhalers if indicated morning of procedure, leave any other valuables at home.  Ensure you have arranged for someone to drive you home the day of your procedure and someone to care for you at home afterwards. It is recommended that you do not drive, drink alcohol, or make any major legal decisions for at least 24 hours after your procedure is complete.  Chlorhexadine sponge along with instruction/information sheet given to patient. Readybath wipes given in lieu of CHG if patient has CHG allergy. Instructed patient to date, time, and initial the verification sheet once skin prep has been completed, and to return to [facility] the day of the procedure.  ERAS instructions given to patient unless otherwise instructed per surgeon's orders.     Anesthesia FAQ tip sheet given to patient.  Instructions and information given to patient about parking, hospital  entrance, and registration location.

## 2025-07-16 DIAGNOSIS — S52.501A CLOSED FRACTURE OF DISTAL END OF RIGHT RADIUS, UNSPECIFIED FRACTURE MORPHOLOGY, INITIAL ENCOUNTER: Primary | ICD-10-CM

## 2025-07-16 LAB
BACTERIA SPEC AEROBE CULT: NO GROWTH
MRSA SPEC QL CULT: NORMAL

## 2025-07-16 RX ORDER — HYDROCODONE BITARTRATE AND ACETAMINOPHEN 7.5; 325 MG/1; MG/1
1 TABLET ORAL EVERY 8 HOURS PRN
Qty: 21 TABLET | Refills: 0 | Status: SHIPPED | OUTPATIENT
Start: 2025-07-16

## 2025-07-17 ENCOUNTER — ANESTHESIA EVENT (OUTPATIENT)
Dept: PERIOP | Facility: HOSPITAL | Age: 75
End: 2025-07-17
Payer: MEDICARE

## 2025-07-17 ENCOUNTER — HOSPITAL ENCOUNTER (OUTPATIENT)
Facility: HOSPITAL | Age: 75
Setting detail: HOSPITAL OUTPATIENT SURGERY
Discharge: HOME | End: 2025-07-17
Attending: ORTHOPAEDIC SURGERY | Admitting: ORTHOPAEDIC SURGERY
Payer: MEDICARE

## 2025-07-17 ENCOUNTER — ANESTHESIA (OUTPATIENT)
Dept: PERIOP | Facility: HOSPITAL | Age: 75
End: 2025-07-17
Payer: MEDICARE

## 2025-07-17 ENCOUNTER — APPOINTMENT (OUTPATIENT)
Dept: GENERAL RADIOLOGY | Facility: HOSPITAL | Age: 75
End: 2025-07-17
Payer: MEDICARE

## 2025-07-17 ENCOUNTER — ANESTHESIA EVENT CONVERTED (OUTPATIENT)
Dept: ANESTHESIOLOGY | Facility: HOSPITAL | Age: 75
End: 2025-07-17
Payer: MEDICARE

## 2025-07-17 VITALS
RESPIRATION RATE: 16 BRPM | DIASTOLIC BLOOD PRESSURE: 66 MMHG | HEART RATE: 75 BPM | OXYGEN SATURATION: 99 % | TEMPERATURE: 98.1 F | SYSTOLIC BLOOD PRESSURE: 139 MMHG

## 2025-07-17 DIAGNOSIS — S52.613A ULNA STYLOID FRACTURE, CLOSED: ICD-10-CM

## 2025-07-17 DIAGNOSIS — S52.501A CLOSED FRACTURE OF DISTAL END OF RIGHT RADIUS, UNSPECIFIED FRACTURE MORPHOLOGY, INITIAL ENCOUNTER: ICD-10-CM

## 2025-07-17 PROBLEM — Z98.890 HISTORY OF OPEN REDUCTION AND INTERNAL FIXATION (ORIF) PROCEDURE: Status: ACTIVE | Noted: 2025-07-17

## 2025-07-17 LAB
QT INTERVAL: 380 MS
QTC INTERVAL: 430 MS

## 2025-07-17 PROCEDURE — 25010000002 BUPIVACAINE (PF) 0.25 % SOLUTION: Performed by: NURSE ANESTHETIST, CERTIFIED REGISTERED

## 2025-07-17 PROCEDURE — C1769 GUIDE WIRE: HCPCS | Performed by: ORTHOPAEDIC SURGERY

## 2025-07-17 PROCEDURE — 25010000002 CEFAZOLIN PER 500 MG: Performed by: PHYSICIAN ASSISTANT

## 2025-07-17 PROCEDURE — 25010000002 FENTANYL CITRATE (PF) 50 MCG/ML SOLUTION PREFILLED SYRINGE: Performed by: NURSE ANESTHETIST, CERTIFIED REGISTERED

## 2025-07-17 PROCEDURE — 25010000002 LIDOCAINE (CARDIAC): Performed by: NURSE ANESTHETIST, CERTIFIED REGISTERED

## 2025-07-17 PROCEDURE — C1713 ANCHOR/SCREW BN/BN,TIS/BN: HCPCS | Performed by: ORTHOPAEDIC SURGERY

## 2025-07-17 PROCEDURE — 25010000002 MIDAZOLAM PER 1MG: Performed by: NURSE ANESTHETIST, CERTIFIED REGISTERED

## 2025-07-17 PROCEDURE — 25010000002 LIDOCAINE PF 2% 2 % SOLUTION: Performed by: NURSE ANESTHETIST, CERTIFIED REGISTERED

## 2025-07-17 PROCEDURE — 76000 FLUOROSCOPY <1 HR PHYS/QHP: CPT

## 2025-07-17 PROCEDURE — 25010000002 DEXAMETHASONE PER 1 MG: Performed by: NURSE ANESTHETIST, CERTIFIED REGISTERED

## 2025-07-17 PROCEDURE — 25010000002 ONDANSETRON PER 1 MG: Performed by: NURSE ANESTHETIST, CERTIFIED REGISTERED

## 2025-07-17 PROCEDURE — 25010000002 CEFAZOLIN PER 500 MG: Performed by: ORTHOPAEDIC SURGERY

## 2025-07-17 PROCEDURE — 25609 OPTX DST RD XART FX/EP SEP3+: CPT | Performed by: ORTHOPAEDIC SURGERY

## 2025-07-17 PROCEDURE — 25010000002 FAMOTIDINE (PF) 20 MG/2ML SOLUTION: Performed by: PHYSICIAN ASSISTANT

## 2025-07-17 PROCEDURE — 25010000002 PROPOFOL 10 MG/ML EMULSION: Performed by: NURSE ANESTHETIST, CERTIFIED REGISTERED

## 2025-07-17 DEVICE — SCRW LK VA W STRDRV 2.4X20MM: Type: IMPLANTABLE DEVICE | Site: WRIST | Status: FUNCTIONAL

## 2025-07-17 DEVICE — IMPLANTABLE DEVICE: Type: IMPLANTABLE DEVICE | Site: WRIST | Status: FUNCTIONAL

## 2025-07-17 DEVICE — SCRW LK VA W STRDRV 2.4X22MM: Type: IMPLANTABLE DEVICE | Site: WRIST | Status: FUNCTIONAL

## 2025-07-17 DEVICE — SCRW CORT S/TAP STRDRV 2.7X14MM: Type: IMPLANTABLE DEVICE | Site: WRIST | Status: FUNCTIONAL

## 2025-07-17 DEVICE — SCRW LK VA W STRDRV 2.4X18MM: Type: IMPLANTABLE DEVICE | Site: WRIST | Status: FUNCTIONAL

## 2025-07-17 DEVICE — SCRW CORT S/TAP STRDRV 2.4X22MM: Type: IMPLANTABLE DEVICE | Site: WRIST | Status: FUNCTIONAL

## 2025-07-17 DEVICE — SCRW LK VA W STRDRV 2.4X12MM: Type: IMPLANTABLE DEVICE | Site: WRIST | Status: FUNCTIONAL

## 2025-07-17 RX ORDER — ONDANSETRON 2 MG/ML
INJECTION INTRAMUSCULAR; INTRAVENOUS AS NEEDED
Status: DISCONTINUED | OUTPATIENT
Start: 2025-07-17 | End: 2025-07-17 | Stop reason: SURG

## 2025-07-17 RX ORDER — DEXAMETHASONE SODIUM PHOSPHATE 4 MG/ML
INJECTION, SOLUTION INTRA-ARTICULAR; INTRALESIONAL; INTRAMUSCULAR; INTRAVENOUS; SOFT TISSUE AS NEEDED
Status: DISCONTINUED | OUTPATIENT
Start: 2025-07-17 | End: 2025-07-17 | Stop reason: SURG

## 2025-07-17 RX ORDER — BUPIVACAINE HYDROCHLORIDE 2.5 MG/ML
INJECTION, SOLUTION EPIDURAL; INFILTRATION; INTRACAUDAL; PERINEURAL
Status: COMPLETED | OUTPATIENT
Start: 2025-07-17 | End: 2025-07-17

## 2025-07-17 RX ORDER — PROCHLORPERAZINE EDISYLATE 5 MG/ML
10 INJECTION INTRAMUSCULAR; INTRAVENOUS ONCE AS NEEDED
Status: DISCONTINUED | OUTPATIENT
Start: 2025-07-17 | End: 2025-07-17 | Stop reason: HOSPADM

## 2025-07-17 RX ORDER — FAMOTIDINE 10 MG/ML
20 INJECTION, SOLUTION INTRAVENOUS
Status: COMPLETED | OUTPATIENT
Start: 2025-07-17 | End: 2025-07-17

## 2025-07-17 RX ORDER — FENTANYL CITRATE 50 UG/ML
INJECTION, SOLUTION INTRAMUSCULAR; INTRAVENOUS AS NEEDED
Status: DISCONTINUED | OUTPATIENT
Start: 2025-07-17 | End: 2025-07-17 | Stop reason: SURG

## 2025-07-17 RX ORDER — MIDAZOLAM HYDROCHLORIDE 2 MG/2ML
INJECTION, SOLUTION INTRAMUSCULAR; INTRAVENOUS AS NEEDED
Status: DISCONTINUED | OUTPATIENT
Start: 2025-07-17 | End: 2025-07-17 | Stop reason: SURG

## 2025-07-17 RX ORDER — LORAZEPAM 2 MG/ML
1 INJECTION INTRAMUSCULAR
Status: DISCONTINUED | OUTPATIENT
Start: 2025-07-17 | End: 2025-07-17 | Stop reason: HOSPADM

## 2025-07-17 RX ORDER — PROPOFOL 10 MG/ML
VIAL (ML) INTRAVENOUS AS NEEDED
Status: DISCONTINUED | OUTPATIENT
Start: 2025-07-17 | End: 2025-07-17 | Stop reason: SURG

## 2025-07-17 RX ORDER — MEPERIDINE HYDROCHLORIDE 25 MG/ML
12.5 INJECTION INTRAMUSCULAR; INTRAVENOUS; SUBCUTANEOUS
Status: DISCONTINUED | OUTPATIENT
Start: 2025-07-17 | End: 2025-07-17 | Stop reason: HOSPADM

## 2025-07-17 RX ORDER — LIDOCAINE HYDROCHLORIDE 20 MG/ML
INJECTION, SOLUTION EPIDURAL; INFILTRATION; INTRACAUDAL; PERINEURAL
Status: COMPLETED | OUTPATIENT
Start: 2025-07-17 | End: 2025-07-17

## 2025-07-17 RX ADMIN — SODIUM CHLORIDE 2 G: 9 INJECTION, SOLUTION INTRAVENOUS at 14:29

## 2025-07-17 RX ADMIN — LIDOCAINE HYDROCHLORIDE 50 MG: 20 INJECTION, SOLUTION INTRAVENOUS at 14:26

## 2025-07-17 RX ADMIN — FENTANYL CITRATE 50 MCG: 50 INJECTION, SOLUTION INTRAMUSCULAR; INTRAVENOUS at 14:35

## 2025-07-17 RX ADMIN — DEXAMETHASONE SODIUM PHOSPHATE 4 MG: 4 INJECTION, SOLUTION INTRA-ARTICULAR; INTRALESIONAL; INTRAMUSCULAR; INTRAVENOUS; SOFT TISSUE at 14:26

## 2025-07-17 RX ADMIN — MIDAZOLAM HYDROCHLORIDE 2 MG: 1 INJECTION, SOLUTION INTRAMUSCULAR; INTRAVENOUS at 12:54

## 2025-07-17 RX ADMIN — ONDANSETRON 4 MG: 2 INJECTION INTRAMUSCULAR; INTRAVENOUS at 14:26

## 2025-07-17 RX ADMIN — BUPIVACAINE HYDROCHLORIDE 10 ML: 2.5 INJECTION, SOLUTION EPIDURAL; INFILTRATION; INTRACAUDAL; PERINEURAL at 12:53

## 2025-07-17 RX ADMIN — PROPOFOL 50 MG: 10 INJECTION, EMULSION INTRAVENOUS at 14:26

## 2025-07-17 RX ADMIN — LIDOCAINE HYDROCHLORIDE 10 ML: 20 INJECTION, SOLUTION EPIDURAL; INFILTRATION; INTRACAUDAL; PERINEURAL at 12:53

## 2025-07-17 RX ADMIN — FAMOTIDINE 20 MG: 10 INJECTION INTRAVENOUS at 11:57

## 2025-07-17 RX ADMIN — PROPOFOL 100 MCG/KG/MIN: 10 INJECTION, EMULSION INTRAVENOUS at 14:31

## 2025-07-17 NOTE — ANESTHESIA PREPROCEDURE EVALUATION
Anesthesia Evaluation     Patient summary reviewed and Nursing notes reviewed   history of anesthetic complications:  PONV  NPO Solid Status: > 8 hours  NPO Liquid Status: > 4 hours           Airway   Mallampati: II  TM distance: >3 FB  Neck ROM: full  No difficulty expected  Dental - normal exam     Pulmonary - normal exam     ROS comment: 7/15/2025  CXR    No acute cardiopulmonary process.  Cardiovascular - normal exam    ECG reviewed  PT is on anticoagulation therapy  Rhythm: regular  Rate: normal    (+) hypertension well controlled less than 2 medications    ROS comment: Test Reason : pre-op surgery  Blood Pressure :   */*   mmHG  Vent. Rate :  77 BPM     Atrial Rate :  77 BPM     P-R Int : 174 ms          QRS Dur :  90 ms      QT Int : 380 ms       P-R-T Axes :  72 -16  65 degrees    QTcB Int : 430 ms     Normal sinus rhythm  Normal ECG  When compared with ECG of 26-Jun-2013 14:00,  No significant change was found  Confirmed by BRANDON CHOWDARY ([id number]) on 7/17/2025 8:51:56 AM         Neuro/Psych  GI/Hepatic/Renal/Endo    (+) diabetes mellitus (prediabetic)    Musculoskeletal     (+) joint swelling      ROS comment: Closed fracture of right distal radius  Osteoarthritis  Osteoporosis without current pathological fracture  Ulna styloid fracture, closed  Osteopenia        Tear of meniscus of knee    Knee swelling  Abdominal    Substance History      OB/GYN negative ob/gyn ROS     Comment: Menopause      Other   arthritis,                   Anesthesia Plan    ASA 3     general with block and MAC     (Risks and benefits discussed including risk of aspiration, recall and dental damage. All patient questions answered.    Will continue with plan of care.    Risks and benefits of peripheral nerve blocks for pain control explained to patient to include infection, bleeding at the site, paresthesia, damage to nerves, and incomplete analgesia.)  intravenous induction     Anesthetic plan, risks, benefits, and alternatives have  been provided, discussed and informed consent has been obtained with: patient.  Pre-procedure education provided    CODE STATUS:

## 2025-07-17 NOTE — ANESTHESIA PROCEDURE NOTES
Peripheral Block      Patient reassessed immediately prior to procedure    Start time: 7/17/2025 12:43 PM  Stop time: 7/17/2025 12:53 PM  Reason for block: at surgeon's request and post-op pain management  Performed by  CRNA/CAA: Peter France CRNA  Preanesthetic Checklist  Completed: patient identified, IV checked, site marked, risks and benefits discussed, surgical consent, monitors and equipment checked, pre-op evaluation and timeout performed  Prep:  Sterile barriers:cap, gloves, mask and sterile barriers  Prep: ChloraPrep  Patient monitoring: blood pressure monitoring, continuous pulse oximetry and EKG  Procedure    Sedation: yes    Guidance:ultrasound guided and nerve stimulator  Images:still images obtained    Laterality:right  Block Type:supraclavicular    Needle Type:echogenic  Needle Gauge:21 G  Resistance on Injection: none    Medications Used: bupivacaine PF (MARCAINE) injection 0.25% - Injection   10 mL - 7/17/2025 12:53:00 PM  lidocaine PF 2% (XYLOCAINE) injection - Injection   10 mL - 7/17/2025 12:53:00 PM      Medications  Comment:Adjuncts per total volume of LA:    Decadron 10 mg PSF      If required, intravenous sedation was given -- see meds on anesthesia record.    Post Assessment  Injection Assessment: negative aspiration for heme, no paresthesia on injection and incremental injection  Patient Tolerance:comfortable throughout block  Complications:no  Additional Notes      ++++++++++++++++++++++++++++++++++     Procedure:               SINGLE SHOT SUPRACLAVICULAR NERVE BLOCK                                       Patient analgesia was achieved with IV Sedation( see meds)      The pt was placed in semi-fowlers position with a slight tilt of the thorax contralateral to the insertion site.  The Insertion Site was prepped and draped in sterile fashion.  The skin was anesthetized with Lidocaine 1% 1ml injection utilizing a 25g needle.  Utilizing ultrasound guidance, a [employer]-[place] 20 ga  echogenic needle was advanced in-plane.  Major vessels (subclavian artery) were visualized as the brachial plexus was approached.  LA spread was visualized and injection was made incrementally every 5 mls with aspiration. Injection pressure was normal or little; there was no intraneural injection, no vascular injection.          Performed by: Peter France CRNA

## 2025-07-17 NOTE — ANESTHESIA POSTPROCEDURE EVALUATION
Patient: Vashti Pichardo    Procedure Summary       Date: 07/17/25 Room / Location: [facility] OR  /  [facility]    Anesthesia Start: 1419 Anesthesia Stop: 1601    Procedure: RIGHT WRIST OPEN REDUCTION INTERNAL FIXATION (Right: Wrist) Diagnosis:       Closed fracture of distal end of right radius, unspecified fracture morphology, initial encounter      Ulna styloid fracture, closed      (Closed fracture of distal end of right radius, unspecified fracture morphology, initial encounter [S52.501A])      (Ulna styloid fracture, closed [S52.613A])    Surgeons: Eduardo Rabago MD Provider: Justine Choudhury CRNA    Anesthesia Type: general with block, MAC ASA Status: 3            Anesthesia Type: general with block, MAC    Vitals  No vitals data found for the desired time range.          Post Anesthesia Care and Evaluation    Patient location during evaluation: bedside  Patient participation: complete - patient participated  Level of consciousness: awake  Pain score: 0  Pain management: adequate    Airway patency: patent  Anesthetic complications: No anesthetic complications  PONV Status: controlled  Cardiovascular status: acceptable and stable  Respiratory status: acceptable and room air  Hydration status: acceptable    Comments: Vital signs as noted in nursing documentation as per protocol

## 2025-07-17 NOTE — INTERVAL H&P NOTE
H&P reviewed. The patient was examined and there are no changes to the H&P.    Vitals:    07/17/25 1134   BP: 138/76   Pulse: 88   Resp: 14   Temp: 97.9 °F (36.6 °C)   SpO2: 96%        Eduardo Rabago MD  7/17/2025  12:23 EDT

## 2025-07-17 NOTE — OP NOTE
[address], [address]  [place]  [address] [phone]      OPERATIVE REPORT      PATIENT NAME:  Vashti Pichardo                            YOB: 1950        PREOP DIAGNOSIS:   Right distal radius fracture.    POSTOP DIAGNOSIS:   Same.    PROCEDURE:    Open reduction and internal fixation of right distal radius fracture.    SURGEON:     Terrance Rabago MD    OPERATIVE TEAM:   Circulator: Mich Ng RN  Scrub Person: Angela Rosado; Roselyn Pineda    ANESTHETIST:  NIALL: Justine Choudhury CRNA; James Mcmillan CRNA    ANESTHESIA:  General plus local    ESTIM BLOOD LOSS:   25 ml    FINDINGS:     Displaced comminuted intra-articular unstable distal radius fracture.     SPECIMENS:    None.    IMPLANTS:     Synthes small fragment distal radius variable angle volar contoured locking plate with cortical and locking screws    DRAINS:     None.    COMPLICATIONS:    None.    DISPOSITION:    Stable to recovery.    INDICATIONS:    Displaced, comminuted intra-articular unstable distal radius fracture.    NARRATIVE:    The patient sustained a traumatic injury to the arm with clinical exam and imaging revealing markedly displaced, comminuted and unstable pattern fracture of the distal radius with intra-articular displacement.  The option of surgical reduction and stabilization for the unstable fracture and injury was reviewed.  Risks, benefits and alternatives discussed and informed consent for surgical procedure obtained. Risks were discussed including but not limited to anesthesia, infection, fracture malunion, nonunion, hardware issues or pain, post-traumatic arthritis, and persistent pain or limitations from the injury condition.  Goals include the potential for earlier pain relief, improved fracture position, better alignment and healing potential, improved arm and hand mobility and function.  The patient presents for planned surgery.    Antibiotic  prophylaxis was given.  Surgeon site marking and a time out were performed prior to the procedure.  Anesthesia was effective and well tolerated.  The arm and hand was prepped and draped in the usual sterile fashion.  A tourniquet was not used and there was good hemostasis throughout the procedure.  After sterile prep and drape, centered over the fracture an incision was made in the volar distal forearm was for a volar approach to the distal radius..  The interval between the flexor carpi radialis (median nerve) and the radial artery and brachioradialis (radial nerve) was utilized.  The superficial radial nerve sensory branches and radial artery were identified and protected. The pronator was dissected from radial to ulnar direction off of the distal radius bone exposing the displaced comminuted fracture.  Minor venous bleeding was well controlled with electrocautery.  A self retaining blunt retractor was placed to facilitate visualization.    Subperiosteal dissection well-exposed the fracture and a near anatomic reduction achieved and the bone and plate held with bone holding clamps and two provisional temporary pins across the plate.  Using the small fragment system, the fracture was well stabilized utilizing a contoured distal radius volar locking plate with hybrid cortical and locking screws.  Screws were placed using standard soft tissue protected drilling, depth gauge measure and screw placement.  The temporary pins were removed.  A secure stable distal radius fracture fixation was achieved.  Final C-arm images were saved with a good reduction and alignment of the fracture and wrist, and good position of the hardware.  There was full wrist passive mobility with fracture stability.    The incision was irrigated copiously with antibiotic solution throughout the procedure and suctioned clear.  Hemostasis was excellent.  Routine closure was performed using 2-0 Vicryl for subcutaneous layer and steri strips for the  skin.  A sterile xeroform, gauze and Tegaderm dressing and well padded wrist fracture brace was applied.  Anesthesia was effective and well tolerated.  There were no complications of the procedure.  The patient was transferred stable to recovery.

## 2025-07-17 NOTE — DISCHARGE INSTRUCTIONS
No pushing, pulling, tugging,  heavy lifting, or strenuous activity.  No major decision making, driving, or drinking alcoholic beverages for 24 hours. ( due to the medications you have  received)  Always use good hand hygiene/washing techniques.  NO driving while taking pain medications.    * if you have an incision:  Check your incision area every day for signs of infection.   Check for:  * more redness, swelling, or pain  *more fluid or blood  *warmth  *pus or bad smell    To assist you in voiding:  Drink plenty of fluids  Listen to running water while attempting to void.    If you are unable to urinate and you have an uncomfortable urge to void or it has been   6 hours since you were discharged, return to the Emergency Room    COMMON SIDE EFFECTS OF SEDATION  Nausea and Vomiting- often occurs within the first few hours after surgery  Dry Mouth- due to reduced production of saliva during surgery  Drowsiness- may last a few hours after surgery or even into the next day  Chills or Shivering- due to body temperature changes during surgery  Temporary Confusion or Disorientation- most common in older adults or with higher sedation doses  Dizziness- often occurs when moving too quickly after waking

## 2025-07-31 ENCOUNTER — OFFICE VISIT (OUTPATIENT)
Dept: ORTHOPEDIC SURGERY | Facility: CLINIC | Age: 75
End: 2025-07-31
Payer: MEDICARE

## 2025-07-31 VITALS
SYSTOLIC BLOOD PRESSURE: 122 MMHG | WEIGHT: 158.2 LBS | HEIGHT: 64 IN | BODY MASS INDEX: 27.01 KG/M2 | TEMPERATURE: 98.4 F | DIASTOLIC BLOOD PRESSURE: 82 MMHG

## 2025-07-31 DIAGNOSIS — S52.501A CLOSED FRACTURE OF DISTAL END OF RIGHT RADIUS, UNSPECIFIED FRACTURE MORPHOLOGY, INITIAL ENCOUNTER: Primary | ICD-10-CM

## 2025-07-31 DIAGNOSIS — Z98.890 S/P ORIF (OPEN REDUCTION INTERNAL FIXATION) FRACTURE: ICD-10-CM

## 2025-07-31 DIAGNOSIS — S52.611A CLOSED DISPLACED FRACTURE OF STYLOID PROCESS OF RIGHT ULNA, INITIAL ENCOUNTER: ICD-10-CM

## 2025-07-31 DIAGNOSIS — Z87.81 S/P ORIF (OPEN REDUCTION INTERNAL FIXATION) FRACTURE: ICD-10-CM

## 2025-07-31 PROCEDURE — 99024 POSTOP FOLLOW-UP VISIT: CPT | Performed by: PHYSICIAN ASSISTANT

## 2025-07-31 NOTE — PROGRESS NOTES
"Subjective   Patient ID: Vashti Pichardo is a 75 y.o. right hand dominant female is here today for a post-operative visit.  Post-op of the Right Wrist (Right Wrist Open Reduction Internal Fixation - 7/17/25 )       CHIEF COMPLAINT:    Progress and recovery after surgery.    Post-op Follow-up  Pain Control:  Well controlled  Fever:  No fever  Diet:  Adequate intake  Activity:  Normal  Operative Site Issues: No          Pain controlled: [] no   [x] yes   Medication refill requested: [x] no   [] yes    Patient compliant with instructions: [] no   [x] yes   Other: Reports good progress since surgery.  She takes otc tylenol as needed       Past Medical History:   Diagnosis Date    Fracture of wrist 7/2025    HTN (hypertension)     Knee swelling     Menopause     Osteoarthritis     Osteopenia     PONV (postoperative nausea and vomiting)     Prediabetes     Tear of meniscus of knee         Past Surgical History:   Procedure Laterality Date    BREAST BIOPSY Left 1990's    Ultrasound guided - Benign results    CATARACT EXTRACTION, BILATERAL      ENTEROCELE REPAIR      JOINT REPLACEMENT  2019    Both knees    KNEE SURGERY Left     ATS, PLM,PMM, 3CC - Dr. Rabago    LAPAROSCOPIC TUBAL LIGATION      ORIF WRIST FRACTURE Right 07/17/2025    Procedure: RIGHT WRIST OPEN REDUCTION INTERNAL FIXATION;  Surgeon: Eduardo Rabago MD;  Location: [facility];  Service: Orthopedics;  Laterality: Right;    REPLACEMENT TOTAL KNEE Right 2019    Dr. Weber    REPLACEMENT TOTAL KNEE Left 2019    Dr. Weber    WRIST SURGERY  7/2025       Allergies   Allergen Reactions    Penicillins Rash       Review of Systems   Skin:  Negative for poor wound healing.     I have reviewed the medical and surgical history, family history, social history, medications, and/or allergies, and the review of systems of this report.    Objective   /82   Temp 98.4 °F (36.9 °C)   Ht 162.6 cm (64\")   Wt 71.8 kg (158 lb 3.2 oz)   LMP  (LMP Unknown)   " BMI 27.15 kg/m²       Signs of infection: [x] no                    [] yes   Drainage: [x] no                    [] yes   Incision: [x] healing well     []healed well   Motor exam intact: [] no                    [x] yes   Neurovascular exam intact: [] no                    [x] yes   Signs of compartment syndrome: [x] no                    [] yes   Signs of DVT: [x] no                    [] yes   Other:      Physical Exam  Ortho Exam    Neurological Exam    Assessment & Plan     Independent Review of Radiographic Studies:    AP, Oblique and lateral of the right wrist taken in the office today, indication to evaluate fracture healing, and compared with prior imaging, shows  good maintained reduction and alignment and intact position of fracture fixation hardware.    Laboratory and Other Studies:  No new results reviewed today.     Medical Decision Making:    Stable neurovascular exam.     Procedures     Diagnoses and all orders for this visit:    1. Closed fracture of distal end of right radius, unspecified fracture morphology, initial encounter (Primary)  -     XR Wrist 3+ View Right; Future    2. Closed displaced fracture of styloid process of right ulna, initial encounter  -     XR Wrist 3+ View Right; Future    3. S/P ORIF (open reduction internal fixation) fracture       Recommendations/Plan:     [] Staples    [] Sutures None to remove   Physical therapy: Will order at next visit     Ultrasound: [x]not ordered         []order given to patient   Labs: [x]not ordered         []order given to patient   Weight Bearing status: []Full []WBAT []PWB [x]NWB []Other     Discussion of orthopaedic goals and activities and patient expressed appreciation.  Regular exercise as tolerated  Guided on proper techniques for mobility and conditioning exercises  Weight bearing parameters reviewed  Take prescribed medications as instructed only as tolerated     Exercise, medications other patient advice, and return appointment as  noted.  Must use the right wrist Velcro brace at all times may remove temporarily for hand hygiene purposes only.  No use of the right upper extremity  Return in about 2 weeks (around 8/14/2025) for XOA right wrist.  Patient is encouraged and agreeable to call or return sooner for any issues or concerns.

## 2025-08-14 ENCOUNTER — OFFICE VISIT (OUTPATIENT)
Dept: ORTHOPEDIC SURGERY | Facility: CLINIC | Age: 75
End: 2025-08-14
Payer: MEDICARE

## 2025-08-14 VITALS
BODY MASS INDEX: 26.63 KG/M2 | HEIGHT: 64 IN | DIASTOLIC BLOOD PRESSURE: 76 MMHG | SYSTOLIC BLOOD PRESSURE: 112 MMHG | WEIGHT: 156 LBS

## 2025-08-14 DIAGNOSIS — S52.501A CLOSED FRACTURE OF DISTAL END OF RIGHT RADIUS, UNSPECIFIED FRACTURE MORPHOLOGY, INITIAL ENCOUNTER: Primary | ICD-10-CM

## 2025-08-14 DIAGNOSIS — Z87.81 S/P ORIF (OPEN REDUCTION INTERNAL FIXATION) FRACTURE: ICD-10-CM

## 2025-08-14 DIAGNOSIS — S52.611A CLOSED DISPLACED FRACTURE OF STYLOID PROCESS OF RIGHT ULNA, INITIAL ENCOUNTER: ICD-10-CM

## 2025-08-14 DIAGNOSIS — Z98.890 S/P ORIF (OPEN REDUCTION INTERNAL FIXATION) FRACTURE: ICD-10-CM

## 2025-08-14 PROCEDURE — 99024 POSTOP FOLLOW-UP VISIT: CPT | Performed by: PHYSICIAN ASSISTANT

## (undated) DEVICE — FLEXIBLE YANKAUER,MEDIUM TIP, NO VACUUM CONTROL: Brand: ARGYLE

## (undated) DEVICE — SLV SCD CALF HEMOFORCE DVT THERP REPROC MD

## (undated) DEVICE — BIT DRL QC DIA W/DEPTHMARK 1.8X110MM

## (undated) DEVICE — COVER,C-ARM,41X74: Brand: MEDLINE

## (undated) DEVICE — BNDG ELAS MATRX V/CLS 4IN 5YD LF

## (undated) DEVICE — DRESSING,GAUZE,XEROFORM,CURAD,1"X8",ST: Brand: CURAD

## (undated) DEVICE — GLOVE,SURG,SENSICARE SLT,LF,PF,8: Brand: MEDLINE

## (undated) DEVICE — Device

## (undated) DEVICE — PK EXTRM UPPR 20

## (undated) DEVICE — GLV SURG SENSICARE PI ORTHO SZ8 LF STRL

## (undated) DEVICE — GW NON THRD 1.25X150MM

## (undated) DEVICE — SUT VIC 2/0 CT1 27IN J259H

## (undated) DEVICE — PATIENT RETURN ELECTRODE, SINGLE-USE, CONTACT QUALITY MONITORING, ADULT, WITH 9FT CORD, FOR PATIENTS WEIGING OVER 33LBS. (15KG): Brand: MEGADYNE

## (undated) DEVICE — PADDING,UNDERCAST,COTTON, 4"X4YD STERILE: Brand: MEDLINE